# Patient Record
Sex: MALE | Race: WHITE | Employment: STUDENT | URBAN - NONMETROPOLITAN AREA
[De-identification: names, ages, dates, MRNs, and addresses within clinical notes are randomized per-mention and may not be internally consistent; named-entity substitution may affect disease eponyms.]

---

## 2017-11-03 ENCOUNTER — HOSPITAL ENCOUNTER (OUTPATIENT)
Dept: PHYSICAL THERAPY | Age: 22
Discharge: HOME | End: 2017-11-30
Admitting: EMERGENCY MEDICINE

## 2017-11-03 NOTE — PLAN OF CARE
[address] [facility] [place]  Phone: [phone]   Fax:     [phone]                                                       Physical Therapy Certification    Dear Referring Practitioner: Yann Toledo,    We had the pleasure of evaluating the following patient for physical therapy services at [address] [place] [address]. A summary of our findings can be found in the initial assessment below. This includes our plan of care. If you have any questions or concerns regarding these findings, please do not hesitate to contact me at the office phone number checked above. Thank you for the referral.       Physician Signature:_______________________________Date:__________________  By signing above (or electronic signature), therapists plan is approved by physician      Patient: Sachi Ramírez   : 1995   MRN: [id number]  Referring Physician: Referring Practitioner: Yann Toledo      Evaluation Date: 11/3/2017      Medical Diagnosis Information:  Diagnosis: LS disc/chronic spondy   Treatment Diagnosis: M54.5                                         Insurance information: PT Insurance Information: [facility]/[employer]      Precautions/ Contra-indications: bilateral pars L5 chronic  Latex Allergy:  [x]NO      []YES  Preferred Language for Healthcare:   [x]English       []other:    SUBJECTIVE: Patient stated complaint:Was deadlifting Monday- felt pain in low back, nothing in legs. No numbness/tingling. Had MRI tue, is on anti inflammatory. No pain since Wednesday afternoon. Been shut down from practice. 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    Functional Disability Index/G-Codes:  PT G-Codes  Functional Assessment Tool Used: ARUN  Score: 8% disability  Functional Limitation: Changing and maintaining body position  Changing and Maintaining Body Position Current []Co-Morbidities              []Cognitive Function, education/learning barriers              []Environmental, home barriers              []profession/work barriers  []past PT/medical experience  []other:  Justification:     Falls Risk Assessment (30 days):   [x] Falls Risk assessed and no intervention required. [] Falls Risk assessed and Patient requires intervention due to being higher risk   TUG score (>12s at risk):     [] Falls education provided, including       G-Codes:  PT G-Codes  Functional Assessment Tool Used: ARUN  Score: 8% disability  Functional Limitation: Changing and maintaining body position  Changing and Maintaining Body Position Current Status (): At least 1 percent but less than 20 percent impaired, limited or restricted  Changing and Maintaining Body Position Goal Status (): At least 1 percent but less than 20 percent impaired, limited or restricted    ASSESSMENT: Patient is a 23year old male with LBP at this point consistent with underlying motor control and facet dysfunction. No neurocompressive signs although does have pathological disc affecting S1.     Functional Impairments:     [x]Noted lumbar/proximal hip hypomobility   []Noted lumbosacral and/or generalized hypermobility   []Decreased Lumbosacral/hip/LE functional ROM   [x]Decreased core/proximal hip strength and neuromuscular control    [x]Decreased LE functional strength    []Abnormal reflexes/sensation/myotomal/dermatomal deficits  []Reduced balance/proprioceptive control    []other:      Functional Activity Limitations (from functional questionnaire and intake)   [x]Reduced ability to tolerate prolonged functional positions   []Reduced ability or difficulty with changes of positions or transfers between positions   []Reduced ability to maintain good posture and demonstrate good body mechanics with sitting, bending, and lifting   []Reduced ability to sleep   [] Reduced ability or tolerance with driving measures addressing any of the following: body structures and functions (impairments), activity limitations, and/or participation restrictions;:  [x] a total of 1-2 or more elements   [] a total of 3 or more elements   [] a total of 4 or more elements   [x] A clinical presentation with:  [x] stable and/or uncomplicated characteristics   [] evolving clinical presentation with changing characteristics  [] unstable and unpredictable characteristics;   [x] Clinical decision making of [x] low, [] moderate, [] high complexity using standardized patient assessment instrument and/or measurable assessment of functional outcome. [x] EVAL (LOW) 38322 (typically 20 minutes face-to-face)  [] EVAL (MOD) 62488 (typically 30 minutes face-to-face)  [] EVAL (HIGH) 49496 (typically 45 minutes face-to-face)  [] RE-EVAL     PLAN: Begin PT focusing on: proximal hip mobilizations, LB mobs, LB core activation, proximal hip activation, and HEP    Frequency/Duration:  2 days per week for 4 Weeks:  Interventions:  [x]  Therapeutic exercise including: strength training, ROM, for LE, Glutes and core   [x]  NMR activation and proprioception for glutes , LE and Core   [x]  Manual therapy as indicated for Hip complex, LE and spine to include: Dry Needling/IASTM, STM, PROM, Gr I-IV mobilizations, manipulation. [x]  Modalities as needed that may include: thermal agents, E-stim, Biofeedback, US, iontophoresis as indicated  [x]  Patient education on joint protection, postural re-education, activity modification, progression of HEP. HEP instruction: work with ATC- bridge, opp arm/leg, planks    GOALS:  Patient stated goal: full pitching/baseball    Therapist goals for Patient:   Short Term Goals: To be achieved in: 2 weeks  1. Independent in HEP and progression per patient tolerance, in order to prevent re-injury.    2. Patient will have a decrease in pain to facilitate improvement in movement, function, and ADLs as indicated by Functional Deficits. Long Term Goals: To be achieved in: 4 weeks  1. Disability index score of 5% or less for the ARUN to assist with reaching prior level of function. 2. Patient will demonstrate increased AROM to WNL, good LS mobility, good hip ROM to allow for proper joint functioning as indicated by patients Functional Deficits. 3. Patient will demonstrate an increase in Strength to good proximal hip and core activation to allow for proper functional mobility as indicated by patients Functional Deficits. 4. Patient will return to single leg eccentrics and core stability functional activities without increased symptoms or restriction.         Electronically signed by:  Genoveva Lopez PT

## 2017-11-03 NOTE — FLOWSHEET NOTE
[address]  [address] [facility]  Phone: [phone] Fax: [phone]    Physical Therapy Daily Treatment Note  Date:  11/3/2017    Patient Name:  Porfirio Faust    :  1995  MRN: [id number]  Restrictions/Precautions:    Medical/Treatment Diagnosis Information:  · Diagnosis: LS disc/chronic spondy  · Treatment Diagnosis: Q46.5  Insurance/Certification information:  PT Insurance Information: [facility]/[employer]   Physician Information:  Referring Practitioner: Lucien Camarillo of care signed (Y/N):     Date of Patient follow up with Physician:     G-Code (if applicable):      Date G-Code Applied:         Progress Note: []  Yes  []  No  Next due by: Visit #10      Latex Allergy:  [x]NO      []YES  Preferred Language for Healthcare:   [x]English       []other:    Visit # Insurance Allowable   1 60 (3 used)     Pain level:  1/10     SUBJECTIVE:  See eval    OBJECTIVE: See eval  Observation:   Test measurements:      RESTRICTIONS/PRECAUTIONS: chronic bilateral pars L5    Exercises/Interventions:     Therapeutic Ex  20 sets/sec reps notes   Hip Ext      Bridge 2-1 10 10    Kneeling Alt Arm-Leg 10 10    Side lying LB rot 2 15    Front Plank      Side planks       Kneeling hip abd/ext      1/2 kneeling down chop      Std band raise 2 15    SL hip abd/clam      Lateral band pull 5 10    Lateral band walk 4 15    Bosu Lunge 2 15    Slide lunge       Ham string stretch 3 30    Hip Flex stretch 3 30    Glute Stretch 3 30          Manual Intervention 12      DN      Prone PA      GISTM/STM 5     Lumbar Manip 5     SI Manip      Hip belt mobs      Hip LA distraction 2           NMR re-education 6      Mf Activation- re-ed 10 10 Prone    TrA Re-ed activation 10 10 supine    Glute Max re-ed activation 10 10 Prone                  Therapeutic Exercise and NMR EXR  [] (28653) Provided verbal/tactile cueing for activities related to strengthening, flexibility, endurance, ROM  for improvements in proximal hip and core control with self care, mobility, lifting and ambulation.  [] (60874) Provided verbal/tactile cueing for activities related to improving balance, coordination, kinesthetic sense, posture, motor skill, proprioception  to assist with core control in self care, mobility, lifting, and ambulation. Therapeutic Activities:    [] (14715 or 35549) Provided verbal/tactile cueing for activities related to improving balance, coordination, kinesthetic sense, posture, motor skill, proprioception and motor activation to allow for proper function  with self care and ADLs  [] (67240) Provided training and instruction to the patient for proper core and proximal hip recruitment and positioning with ambulation re-education     Home Exercise Program:    [x] (54544) Reviewed/Progressed HEP activities related to strengthening, flexibility, endurance, ROM of core, proximal hip and LE for functional self-care, mobility, lifting and ambulation   [] (69188) Reviewed/Progressed HEP activities related to improving balance, coordination, kinesthetic sense, posture, motor skill, proprioception of core, proximal hip and LE for self care, mobility, lifting, and ambulation      Manual Treatments:  PROM / STM / Oscillations-Mobs:  G-I, II, III, IV (PA's, Inf., Post.)  [] (11277) Provided manual therapy to mobilize proximal hip and LS spine soft tissue/joints for the purpose of modulating pain, promoting relaxation,  increasing ROM, reducing/eliminating soft tissue swelling/inflammation/restriction, improving soft tissue extensibility and allowing for proper ROM for normal function with self care, mobility, lifting and ambulation.      Modalities:       Charges:  Timed Code Treatment Minutes: 38   Total Treatment Minutes: 58     [x] EVAL (LOW) 65373 (typically 20 minutes face-to-face)  [] EVAL (MOD) 13626 (typically 30 minutes face-to-face)  [] EVAL (HIGH) 423 5422 (typically 45 minutes face-to-face)  [] RE-EVAL     [x] EH(84577) x  1   [] IONTO  [] NMR (90573) x      [] VASO  [x] Manual (38024) x  1    [] Other:  [] TA x       [] Mech Traction (17673)  [] ES(attended) (70658)      [] ES (un) (14255):     GOALS:  Patient stated goal: full pitching/baseball     Therapist goals for Patient:   Short Term Goals: To be achieved in: 2 weeks  1. Independent in HEP and progression per patient tolerance, in order to prevent re-injury. 2. Patient will have a decrease in pain to facilitate improvement in movement, function, and ADLs as indicated by Functional Deficits.     Long Term Goals: To be achieved in: 4 weeks  1. Disability index score of 5% or less for the ARUN to assist with reaching prior level of function. 2. Patient will demonstrate increased AROM to WNL, good LS mobility, good hip ROM to allow for proper joint functioning as indicated by patients Functional Deficits. 3. Patient will demonstrate an increase in Strength to good proximal hip and core activation to allow for proper functional mobility as indicated by patients Functional Deficits. 4. Patient will return to single leg eccentrics and core stability functional activities without increased symptoms or restriction    Progression Towards Functional goals:  [] Patient is progressing as expected towards functional goals listed. [] Progression is slowed due to complexities listed. [] Progression has been slowed due to co-morbidities.   [x] Plan just implemented, too soon to assess goals progression  [] Other:     ASSESSMENT:  See eval    Treatment/Activity Tolerance:  [x] Patient tolerated treatment well [] Patient limited by fatique  [] Patient limited by pain  [] Patient limited by other medical complications  [] Other:     Prognosis: [x] Good [] Fair  [] Poor    Patient Requires Follow-up: [x] Yes  [] No    PLAN: See eval  [] Continue per plan of care [] Alter current plan (see comments)  [x] Plan of care initiated [] Hold pending MD visit [] Discharge    Electronically signed by: Maribel Red PT

## 2017-11-07 ENCOUNTER — HOSPITAL ENCOUNTER (OUTPATIENT)
Dept: PHYSICAL THERAPY | Age: 22
Discharge: HOME | End: 2017-11-07
Admitting: EMERGENCY MEDICINE

## 2017-11-07 NOTE — FLOWSHEET NOTE
[address] [address] [facility]  Phone: [phone] Fax: [phone]    Physical Therapy Daily Treatment Note  Date:  2017    Patient Name:  Dayton Parker    :  1995  MRN: [id number]  Restrictions/Precautions:    Medical/Treatment Diagnosis Information:  · Diagnosis: LS disc/chronic spondy  · Treatment Diagnosis: A39.9  Insurance/Certification information:  PT Insurance Information: [facility]/[employer]   Physician Information:  Referring Practitioner: Dean Pascual of care signed (Y/N):     Date of Patient follow up with Physician:     G-Code (if applicable):      Date G-Code Applied:         Progress Note: []  Yes  []  No  Next due by: Visit #10      Latex Allergy:  [x]NO      []YES  Preferred Language for Healthcare:   [x]English       []other:    Visit # Insurance Allowable   2 60 (3 used)     Pain level:  1/10     SUBJECTIVE:  Back feeling good. Pt eager to get working. OBJECTIVE: cues for core activation and proper glute activation.    Observation:   Test measurements:      RESTRICTIONS/PRECAUTIONS: chronic bilateral pars L5    Exercises/Interventions:     Therapeutic Ex  35 sets/sec reps notes   Hip Ext      Bridge 2-1 5s 10 BOSU, SL   Kneeling Alt Arm-Leg 10 10 Gray, Airex   Side lying LB rot 2 15    Front Plank+ knee rotations 3 5 Hold 10s+rotations, BOSU   Standing Glute Kicks 2 10 maroon   Glute Step Up + wedge 2 10 8inch, wedge for foot, no wt   SL RDL 2 10 Wedge, stick for form   Std band raise    SL hip abd/clam      Lateral band pull 5 10    Lateral band walk 4 15 2 maroon   Bosu Lunge 2 15 Cues for glute emphasis   Slide lunge       Ham string stretch 3 30    Hip Flex stretch 3 30    Glute Stretch 3 30          Manual Intervention 0      DN      Prone PA      GISTM/STM 5     Lumbar Manip 5     SI Manip      Hip belt mobs      Hip LA distraction 2           NMR re-education 10      Mf Activation- re-ed 10 10 Prone    TrA Re-ed activation 10 10 Supine, LE's only   Glute Max re-ed activation 10 10 Prone    frog 10 10            Therapeutic Exercise and NMR EXR  [] (99767) Provided verbal/tactile cueing for activities related to strengthening, flexibility, endurance, ROM  for improvements in proximal hip and core control with self care, mobility, lifting and ambulation.  [] (38971) Provided verbal/tactile cueing for activities related to improving balance, coordination, kinesthetic sense, posture, motor skill, proprioception  to assist with core control in self care, mobility, lifting, and ambulation. Therapeutic Activities:    [] (83686 or 43101) Provided verbal/tactile cueing for activities related to improving balance, coordination, kinesthetic sense, posture, motor skill, proprioception and motor activation to allow for proper function  with self care and ADLs  [] (84462) Provided training and instruction to the patient for proper core and proximal hip recruitment and positioning with ambulation re-education     Home Exercise Program:    [x] (65607) Reviewed/Progressed HEP activities related to strengthening, flexibility, endurance, ROM of core, proximal hip and LE for functional self-care, mobility, lifting and ambulation   [] (17097) Reviewed/Progressed HEP activities related to improving balance, coordination, kinesthetic sense, posture, motor skill, proprioception of core, proximal hip and LE for self care, mobility, lifting, and ambulation      Manual Treatments:  PROM / STM / Oscillations-Mobs:  G-I, II, III, IV (PA's, Inf., Post.)  [] (65966) Provided manual therapy to mobilize proximal hip and LS spine soft tissue/joints for the purpose of modulating pain, promoting relaxation,  increasing ROM, reducing/eliminating soft tissue swelling/inflammation/restriction, improving soft tissue extensibility and allowing for proper ROM for normal function with self care, mobility, lifting and ambulation. Modalities:       Charges:  Timed Code Treatment Minutes: 45   Total Treatment Minutes: 45     [x] EVAL (LOW) 11196 (typically 20 minutes face-to-face)  [] EVAL (MOD) 24887 (typically 30 minutes face-to-face)  [] EVAL (HIGH) 61188 (typically 45 minutes face-to-face)  [] RE-EVAL     [x] BQ(10365) x  2   [] IONTO  [x] NMR (87186) x  1   [] VASO  [] Manual (08176) x       [] Other:  [] TA x       [] Mech Traction (39188)  [] ES(attended) (58217)      [] ES (un) (65306):     GOALS:  Patient stated goal: full pitching/baseball     Therapist goals for Patient:   Short Term Goals: To be achieved in: 2 weeks  1. Independent in HEP and progression per patient tolerance, in order to prevent re-injury. 2. Patient will have a decrease in pain to facilitate improvement in movement, function, and ADLs as indicated by Functional Deficits.     Long Term Goals: To be achieved in: 4 weeks  1. Disability index score of 5% or less for the ARUN to assist with reaching prior level of function. 2. Patient will demonstrate increased AROM to WNL, good LS mobility, good hip ROM to allow for proper joint functioning as indicated by patients Functional Deficits. 3. Patient will demonstrate an increase in Strength to good proximal hip and core activation to allow for proper functional mobility as indicated by patients Functional Deficits. 4. Patient will return to single leg eccentrics and core stability functional activities without increased symptoms or restriction    Progression Towards Functional goals:  [] Patient is progressing as expected towards functional goals listed. [] Progression is slowed due to complexities listed. [] Progression has been slowed due to co-morbidities. [x] Plan just implemented, too soon to assess goals progression  [] Other:     ASSESSMENT:  Good tolerance to exercise.   No pain at conclusion of treatment, but Pt had difficulty with core and glute activation to have proper form with exercise in

## 2017-11-09 ENCOUNTER — HOSPITAL ENCOUNTER (OUTPATIENT)
Dept: PHYSICAL THERAPY | Age: 22
Discharge: HOME | End: 2017-11-09
Admitting: EMERGENCY MEDICINE

## 2017-11-09 NOTE — FLOWSHEET NOTE
[address] [address] [place] [address]  Phone: [phone] Fax: [phone]    Physical Therapy Daily Treatment Note  Date:  2017    Patient Name:  Krishan Segovia    :  1995  MRN: [id number]  Restrictions/Precautions:    Medical/Treatment Diagnosis Information:  · Diagnosis: LS disc/chronic spondy  · Treatment Diagnosis: I84.5  Insurance/Certification information:  PT Insurance Information: [facility]/[employer]   Physician Information:  Referring Practitioner: Zahida Bolanos of care signed (Y/N):     Date of Patient follow up with Physician:     G-Code (if applicable):      Date G-Code Applied:         Progress Note: []  Yes  []  No  Next due by: Visit #10      Latex Allergy:  [x]NO      []YES  Preferred Language for Healthcare:   [x]English       []other:    Visit # Insurance Allowable   3 60 (3 used)     Pain level:  1/10     SUBJECTIVE:  Back feeling good. Some soreness last time, but more from not having done anything. OBJECTIVE: cues for core activation and proper glute activation. Observation:   Test measurements:      RESTRICTIONS/PRECAUTIONS: chronic bilateral pars L5    Exercises/Interventions:     Therapeutic Ex  40 sets/sec reps notes   Bike      Bridge 2-1 5s 10 BOSU, SL   Kneeling Alt Arm-Leg 10 10 [place], Airex   Side lying LB rot 2 15    Front Plank+ knee rotations   Hold 10s+rotations, BOSU   Standing Glute Kicks 2 10 maroon   Glute Step Up + wedge 2 10 8inch, wedge for foot, no wt   SL RDL 2 10 Form!    Std band raise    SL hip abd/clam      Lateral band pull 5 10    Lateral band walk 4 15 2 maroon   Bosu Lunge 2 15 Cues for glute emphasis   Slide lunge  3 10    SC march all 4 ways 4 50    SC lunge- both way 2 10 Both legs, ea way   SC pitching  15 R handed         Manual Intervention 0      DN      Prone PA      GISTM/STM      Lumbar Manip      SI Manip      Hip belt mobs      Hip LA distraction            NMR re-education 15      Mf Activation- re-ed 10 10 Prone    TrA Re-ed activation 10 10 Supine, LE's only   Glute Max re-ed activation 10 10 Prone    frog 10 10            Therapeutic Exercise and NMR EXR  [x] (86181) Provided verbal/tactile cueing for activities related to strengthening, flexibility, endurance, ROM  for improvements in proximal hip and core control with self care, mobility, lifting and ambulation. [x] (56739) Provided verbal/tactile cueing for activities related to improving balance, coordination, kinesthetic sense, posture, motor skill, proprioception  to assist with core control in self care, mobility, lifting, and ambulation.      Therapeutic Activities:    [x] (21379 or 71201) Provided verbal/tactile cueing for activities related to improving balance, coordination, kinesthetic sense, posture, motor skill, proprioception and motor activation to allow for proper function  with self care and ADLs  [] (25631) Provided training and instruction to the patient for proper core and proximal hip recruitment and positioning with ambulation re-education     Home Exercise Program:    [x] (77922) Reviewed/Progressed HEP activities related to strengthening, flexibility, endurance, ROM of core, proximal hip and LE for functional self-care, mobility, lifting and ambulation   [] (83566) Reviewed/Progressed HEP activities related to improving balance, coordination, kinesthetic sense, posture, motor skill, proprioception of core, proximal hip and LE for self care, mobility, lifting, and ambulation      Manual Treatments:  PROM / STM / Oscillations-Mobs:  G-I, II, III, IV (PA's, Inf., Post.)  [] (67791) Provided manual therapy to mobilize proximal hip and LS spine soft tissue/joints for the purpose of modulating pain, promoting relaxation,  increasing ROM, reducing/eliminating soft tissue swelling/inflammation/restriction, improving soft tissue extensibility and allowing for proper ROM for normal function with self care, mobility, lifting and ambulation. Modalities:       Charges:  Timed Code Treatment Minutes: 55   Total Treatment Minutes: 55     [] EVAL (LOW) 96412 (typically 20 minutes face-to-face)  [] EVAL (MOD) 72135 (typically 30 minutes face-to-face)  [] EVAL (HIGH) 39520 (typically 45 minutes face-to-face)  [] RE-EVAL      [x] BI(75166) x  3   [] IONTO  [x] NMR (57454) x  1   [] VASO  [] Manual (43957) x       [] Other:  [] TA x       [] Mech Traction (18670)  [] ES(attended) (27285)      [] ES (un) (13150):     GOALS:  Patient stated goal: full pitching/baseball     Therapist goals for Patient:   Short Term Goals: To be achieved in: 2 weeks  1. Independent in HEP and progression per patient tolerance, in order to prevent re-injury. 2. Patient will have a decrease in pain to facilitate improvement in movement, function, and ADLs as indicated by Functional Deficits.     Long Term Goals: To be achieved in: 4 weeks  1. Disability index score of 5% or less for the ARUN to assist with reaching prior level of function. 2. Patient will demonstrate increased AROM to WNL, good LS mobility, good hip ROM to allow for proper joint functioning as indicated by patients Functional Deficits. 3. Patient will demonstrate an increase in Strength to good proximal hip and core activation to allow for proper functional mobility as indicated by patients Functional Deficits. 4. Patient will return to single leg eccentrics and core stability functional activities without increased symptoms or restriction    Progression Towards Functional goals:  [x] Patient is progressing as expected towards functional goals listed. [] Progression is slowed due to complexities listed. [] Progression has been slowed due to co-morbidities. [] Plan just implemented, too soon to assess goals progression  [] Other:     ASSESSMENT:  Good tolerance to exercise. Progressing core and proximal hip working into sport specific positioning.

## 2017-11-15 ENCOUNTER — HOSPITAL ENCOUNTER (OUTPATIENT)
Dept: PHYSICAL THERAPY | Age: 22
Discharge: HOME | End: 2017-11-15
Admitting: EMERGENCY MEDICINE

## 2017-11-15 NOTE — FLOWSHEET NOTE
NMR re-education 15      Mf Activation- re-ed 10 10 Prone    TrA Re-ed activation 10 10 Supine, LE's only   Glute Max re-ed activation 10 10 Prone    frog 10 10            Therapeutic Exercise and NMR EXR  [x] (81072) Provided verbal/tactile cueing for activities related to strengthening, flexibility, endurance, ROM  for improvements in proximal hip and core control with self care, mobility, lifting and ambulation. [x] (90069) Provided verbal/tactile cueing for activities related to improving balance, coordination, kinesthetic sense, posture, motor skill, proprioception  to assist with core control in self care, mobility, lifting, and ambulation.      Therapeutic Activities:    [x] (10385 or 27829) Provided verbal/tactile cueing for activities related to improving balance, coordination, kinesthetic sense, posture, motor skill, proprioception and motor activation to allow for proper function  with self care and ADLs  [] (14455) Provided training and instruction to the patient for proper core and proximal hip recruitment and positioning with ambulation re-education     Home Exercise Program:    [x] (24215) Reviewed/Progressed HEP activities related to strengthening, flexibility, endurance, ROM of core, proximal hip and LE for functional self-care, mobility, lifting and ambulation   [] (69655) Reviewed/Progressed HEP activities related to improving balance, coordination, kinesthetic sense, posture, motor skill, proprioception of core, proximal hip and LE for self care, mobility, lifting, and ambulation      Manual Treatments:  PROM / STM / Oscillations-Mobs:  G-I, II, III, IV (PA's, Inf., Post.)  [] (60805) Provided manual therapy to mobilize proximal hip and LS spine soft tissue/joints for the purpose of modulating pain, promoting relaxation,  increasing ROM, reducing/eliminating soft tissue swelling/inflammation/restriction, improving soft tissue extensibility and allowing for proper ROM for normal function Patient may begin working into throwing with .      Treatment/Activity Tolerance:  [x] Patient tolerated treatment well [] Patient limited by fatique  [] Patient limited by pain  [] Patient limited by other medical complications  [] Other:     Prognosis: [x] Good [] Fair  [] Poor    Patient Requires Follow-up: [x] Yes  [] No    PLAN: Continue core/proximal hip- working into sport specific  [] Continue per plan of care [] Alter current plan (see comments)  [x] Plan of care initiated [] Hold pending MD visit [] Discharge    Electronically signed by: Sumaya Carrera, [id number] [employer]

## 2017-11-17 ENCOUNTER — HOSPITAL ENCOUNTER (OUTPATIENT)
Dept: PHYSICAL THERAPY | Age: 22
Discharge: HOME | End: 2017-11-17
Admitting: EMERGENCY MEDICINE

## 2017-11-17 NOTE — FLOWSHEET NOTE
[address] [address] [facility] [place]  Phone: [phone] Fax: [phone]    Physical Therapy Daily Treatment Note  Date:  2017    Patient Name:  Viviana Coates    :  1995  MRN: [id number]  Restrictions/Precautions:    Medical/Treatment Diagnosis Information:  · Diagnosis: LS disc/chronic spondy  · Treatment Diagnosis: X56.4  Insurance/Certification information:  PT Insurance Information: [facility]/[employer]   Physician Information:  Referring Practitioner: Stevenson Schwab of care signed (Y/N):     Date of Patient follow up with Physician:     G-Code (if applicable):      Date G-Code Applied:         Progress Note: []  Yes  []  No  Next due by: Visit #10      Latex Allergy:  [x]NO      []YES  Preferred Language for Healthcare:   [x]English       []other:    Visit # Insurance Allowable   5 60 (3 used)     Pain level:  1/10     SUBJECTIVE:  Patient reports reports fatigue after last treatment but no increased pain     OBJECTIVE: cues for core activation and proper glute activation. Observation:   Test measurements:      RESTRICTIONS/PRECAUTIONS: chronic bilateral pars L5    Exercises/Interventions:     Therapeutic Ex  40 sets/sec reps notes   Bike      Bridge 2-1 5s 10 BOSU, SL   Kneeling Alt Arm-Leg 10 10 [place], Airex   Side lying LB rot 2 15    Front Plank+ knee rotations   Hold 10s+rotations, BOSU   Standing Glute Kicks 2 10 maroon   Glute Step Up + wedge 2 10 8inch, wedge for foot, no wt   SL RDL 2 10 Form!    Std band raise    SL hip abd/clam      Lateral band pull 5 10    Lateral band walk 4 15 2 maroon   Bosu Lunge 2 15 Cues for glute emphasis   Slide lunge  3 10    SC march all 4 ways 4 50    SC lunge- both way 2 10 Both legs, ea way   SC pitching  15 R handed   Rebounder tosses 2 10 3#    Manual Intervention 0      DN      Prone PA      GISTM/STM      Lumbar Manip      SI Manip      Hip belt mobs      Hip LA distraction NMR re-education 15      Mf Activation- re-ed 10 10 Prone    TrA Re-ed activation 10 10 Supine, LE's only   Glute Max re-ed activation 10 10 Prone    frog 10 10            Therapeutic Exercise and NMR EXR  [x] (41476) Provided verbal/tactile cueing for activities related to strengthening, flexibility, endurance, ROM  for improvements in proximal hip and core control with self care, mobility, lifting and ambulation. [x] (67795) Provided verbal/tactile cueing for activities related to improving balance, coordination, kinesthetic sense, posture, motor skill, proprioception  to assist with core control in self care, mobility, lifting, and ambulation.      Therapeutic Activities:    [x] (77707 or 66061) Provided verbal/tactile cueing for activities related to improving balance, coordination, kinesthetic sense, posture, motor skill, proprioception and motor activation to allow for proper function  with self care and ADLs  [] (80265) Provided training and instruction to the patient for proper core and proximal hip recruitment and positioning with ambulation re-education     Home Exercise Program:    [x] (74184) Reviewed/Progressed HEP activities related to strengthening, flexibility, endurance, ROM of core, proximal hip and LE for functional self-care, mobility, lifting and ambulation   [] (17130) Reviewed/Progressed HEP activities related to improving balance, coordination, kinesthetic sense, posture, motor skill, proprioception of core, proximal hip and LE for self care, mobility, lifting, and ambulation      Manual Treatments:  PROM / STM / Oscillations-Mobs:  G-I, II, III, IV (PA's, Inf., Post.)  [] (93605) Provided manual therapy to mobilize proximal hip and LS spine soft tissue/joints for the purpose of modulating pain, promoting relaxation,  increasing ROM, reducing/eliminating soft tissue swelling/inflammation/restriction, improving soft tissue extensibility and allowing for proper ROM for normal function with self care, mobility, lifting and ambulation. Modalities:       Charges:  Timed Code Treatment Minutes: 55   Total Treatment Minutes: 55     [] EVAL (LOW) 80758 (typically 20 minutes face-to-face)  [] EVAL (MOD) 58767 (typically 30 minutes face-to-face)  [] EVAL (HIGH) 74961 (typically 45 minutes face-to-face)  [] RE-EVAL      [x] CQ(99168) x  3   [] IONTO  [x] NMR (34332) x  1   [] VASO  [] Manual (96754) x       [] Other:  [] TA x       [] Mech Traction (34377)  [] ES(attended) (02812)      [] ES (un) (88981):     GOALS:  Patient stated goal: full pitching/baseball     Therapist goals for Patient:   Short Term Goals: To be achieved in: 2 weeks  1. Independent in HEP and progression per patient tolerance, in order to prevent re-injury. 2. Patient will have a decrease in pain to facilitate improvement in movement, function, and ADLs as indicated by Functional Deficits.     Long Term Goals: To be achieved in: 4 weeks  1. Disability index score of 5% or less for the ARUN to assist with reaching prior level of function. 2. Patient will demonstrate increased AROM to WNL, good LS mobility, good hip ROM to allow for proper joint functioning as indicated by patients Functional Deficits. 3. Patient will demonstrate an increase in Strength to good proximal hip and core activation to allow for proper functional mobility as indicated by patients Functional Deficits. 4. Patient will return to single leg eccentrics and core stability functional activities without increased symptoms or restriction    Progression Towards Functional goals:  [x] Patient is progressing as expected towards functional goals listed. [] Progression is slowed due to complexities listed. [] Progression has been slowed due to co-morbidities. [] Plan just implemented, too soon to assess goals progression  [] Other:     ASSESSMENT:  Good tolerance to exercise. Progressing core and proximal hip working into sport specific positioning. Patient may begin working into throwing with .      Treatment/Activity Tolerance:  [x] Patient tolerated treatment well [] Patient limited by fatique  [] Patient limited by pain  [] Patient limited by other medical complications  [] Other:     Prognosis: [x] Good [] Fair  [] Poor    Patient Requires Follow-up: [x] Yes  [] No    PLAN: Continue core/proximal hip- working into sport specific  [] Continue per plan of care [] Alter current plan (see comments)  [x] Plan of care initiated [] Hold pending MD visit [] Discharge    Electronically signed by: Brian Eddy, [id number] [employer]

## 2017-11-28 ENCOUNTER — HOSPITAL ENCOUNTER (OUTPATIENT)
Dept: PHYSICAL THERAPY | Age: 22
Discharge: HOME | End: 2017-11-28
Admitting: EMERGENCY MEDICINE

## 2017-11-28 NOTE — FLOWSHEET NOTE
[address] [address] [place]  Phone: [phone] Fax: [phone]    Physical Therapy Daily Treatment Note  Date:  2017    Patient Name:  Debora Dunbar    :  1995  MRN: [id number]  Restrictions/Precautions:    Medical/Treatment Diagnosis Information:  · Diagnosis: LS disc/chronic spondy  · Treatment Diagnosis: Q36.5  Insurance/Certification information:  PT Insurance Information: [facility]/[employer]   Physician Information:  Referring Practitioner: [place] of care signed (Y/N):     Date of Patient follow up with Physician:     G-Code (if applicable):      Date G-Code Applied:         Progress Note: []  Yes  []  No  Next due by: Visit #10      Latex Allergy:  [x]NO      []YES  Preferred Language for Healthcare:   [x]English       []other:    Visit # Insurance Allowable   6 60 (3 used)     Pain level:  0/10     SUBJECTIVE:  Patient reports reports fatigue after last treatment but not feeling pain. Pt has started back into some pitching without issue. Plans to pitch possibly tomorrow. OBJECTIVE: cues for core activation and proper glute activation. Observation:   Test measurements:      RESTRICTIONS/PRECAUTIONS: chronic bilateral pars L5    Exercises/Interventions:     Therapeutic Ex  40 sets/sec reps notes   Bike      Bridge 2-1 5s 10 BOSU, SL   Kneeling Alt Arm-Leg 10 10 [place], Airex   Side lying LB rot 2 15    Front Plank+ knee rotations   Hold 10s+rotations, BOSU   Standing Glute Kicks 2 10 maroon   Glute Step Up + wedge 2 10 8inch, wedge for foot, no wt   SL RDL 2 10 Form!    Std band raise    SL hip abd/clam      Lateral band pull 5 10    Lateral band walk 4 15 2 maroon   Bosu Lunge 2 15 Cues for glute emphasis   Slide lunge  3 10    SC march all 4 ways 4 50    SC lunge- both way 2 10 Both legs, ea way   SC pitching  15 R handed   Rebounder tosses 2 10 3#    Manual Intervention 0      DN      Prone PA GISTM/STM      Lumbar Manip      SI Manip      Hip belt mobs      Hip LA distraction            NMR re-education 15      Mf Activation- re-ed 10 10 Prone    TrA Re-ed activation 10 10 Supine, LE's only   Glute Max re-ed activation 10 10 Prone    frog 10 10            Therapeutic Exercise and NMR EXR  [x] (65011) Provided verbal/tactile cueing for activities related to strengthening, flexibility, endurance, ROM  for improvements in proximal hip and core control with self care, mobility, lifting and ambulation. [x] (43067) Provided verbal/tactile cueing for activities related to improving balance, coordination, kinesthetic sense, posture, motor skill, proprioception  to assist with core control in self care, mobility, lifting, and ambulation.      Therapeutic Activities:    [x] (69283 or 48579) Provided verbal/tactile cueing for activities related to improving balance, coordination, kinesthetic sense, posture, motor skill, proprioception and motor activation to allow for proper function  with self care and ADLs  [] (91910) Provided training and instruction to the patient for proper core and proximal hip recruitment and positioning with ambulation re-education     Home Exercise Program:    [x] (91390) Reviewed/Progressed HEP activities related to strengthening, flexibility, endurance, ROM of core, proximal hip and LE for functional self-care, mobility, lifting and ambulation   [] (68131) Reviewed/Progressed HEP activities related to improving balance, coordination, kinesthetic sense, posture, motor skill, proprioception of core, proximal hip and LE for self care, mobility, lifting, and ambulation      Manual Treatments:  PROM / STM / Oscillations-Mobs:  G-I, II, III, IV (PA's, Inf., Post.)  [] (86661) Provided manual therapy to mobilize proximal hip and LS spine soft tissue/joints for the purpose of modulating pain, promoting relaxation,  increasing ROM, reducing/eliminating soft tissue swelling/inflammation/restriction, improving soft tissue extensibility and allowing for proper ROM for normal function with self care, mobility, lifting and ambulation. Modalities:       Charges:  Timed Code Treatment Minutes: 55   Total Treatment Minutes: 55     [] EVAL (LOW) 98409 (typically 20 minutes face-to-face)  [] EVAL (MOD) 02397 (typically 30 minutes face-to-face)  [] EVAL (HIGH) 40482 (typically 45 minutes face-to-face)  [] RE-EVAL      [x] IS(42255) x  3   [] IONTO  [x] NMR (79983) x  1   [] VASO  [] Manual (42998) x       [] Other:  [] TA x       [] Mech Traction (21517)  [] ES(attended) (23542)      [] ES (un) (22673):     GOALS:  Patient stated goal: full pitching/baseball     Therapist goals for Patient:   Short Term Goals: To be achieved in: 2 weeks  1. Independent in HEP and progression per patient tolerance, in order to prevent re-injury. 2. Patient will have a decrease in pain to facilitate improvement in movement, function, and ADLs as indicated by Functional Deficits.     Long Term Goals: To be achieved in: 4 weeks  1. Disability index score of 5% or less for the ARUN to assist with reaching prior level of function. 2. Patient will demonstrate increased AROM to WNL, good LS mobility, good hip ROM to allow for proper joint functioning as indicated by patients Functional Deficits. 3. Patient will demonstrate an increase in Strength to good proximal hip and core activation to allow for proper functional mobility as indicated by patients Functional Deficits. 4. Patient will return to single leg eccentrics and core stability functional activities without increased symptoms or restriction    Progression Towards Functional goals:  [x] Patient is progressing as expected towards functional goals listed. [] Progression is slowed due to complexities listed. [] Progression has been slowed due to co-morbidities.    [] Plan just implemented, too soon to assess goals progression  [] Other:

## 2017-11-30 ENCOUNTER — HOSPITAL ENCOUNTER (OUTPATIENT)
Dept: PHYSICAL THERAPY | Age: 22
Discharge: HOME | End: 2017-11-30
Admitting: EMERGENCY MEDICINE

## 2017-11-30 NOTE — FLOWSHEET NOTE
ASSESSMENT:  Good tolerance to exercise. Progressing core and proximal hip working into sport specific positioning. Patient to continue working with ATC to progress back into sport. Treatment/Activity Tolerance:  [x] Patient tolerated treatment well [] Patient limited by fatique  [] Patient limited by pain  [] Patient limited by other medical complications  [] Other:     Prognosis: [x] Good [] Fair  [] Poor    Patient Requires Follow-up: [x] Yes  [] No    PLAN:  D/C at this time.   Pt to contact PT if he has questions regarding lifting or issues with return to baseball.  [] Continue per plan of care [] Alter current plan (see comments)  [] Plan of care initiated [] Hold pending MD visit [x] Discharge    Electronically signed by: Tenzin Florian PTA

## 2017-12-01 ENCOUNTER — HOSPITAL ENCOUNTER (OUTPATIENT)
Dept: OTHER | Age: 22
Discharge: HOME | End: 2017-12-31
Attending: EMERGENCY MEDICINE | Admitting: EMERGENCY MEDICINE

## 2019-02-22 ENCOUNTER — HOSPITAL ENCOUNTER (OUTPATIENT)
Dept: PHYSICAL THERAPY | Age: 24
Setting detail: THERAPIES SERIES
Discharge: HOME | End: 2019-02-22
Payer: COMMERCIAL

## 2019-02-22 PROCEDURE — 97110 THERAPEUTIC EXERCISES: CPT

## 2019-02-22 PROCEDURE — 97140 MANUAL THERAPY 1/> REGIONS: CPT

## 2019-02-22 PROCEDURE — 97161 PT EVAL LOW COMPLEX 20 MIN: CPT

## 2019-02-25 PROCEDURE — G8984 CARRY CURRENT STATUS: HCPCS

## 2019-02-25 PROCEDURE — G8985 CARRY GOAL STATUS: HCPCS

## 2019-02-27 ENCOUNTER — HOSPITAL ENCOUNTER (OUTPATIENT)
Dept: PHYSICAL THERAPY | Age: 24
Setting detail: THERAPIES SERIES
Discharge: HOME | End: 2019-02-27
Payer: COMMERCIAL

## 2019-02-27 PROCEDURE — 97140 MANUAL THERAPY 1/> REGIONS: CPT

## 2019-02-27 PROCEDURE — 97016 VASOPNEUMATIC DEVICE THERAPY: CPT

## 2019-02-27 PROCEDURE — 97110 THERAPEUTIC EXERCISES: CPT

## 2019-03-04 ENCOUNTER — HOSPITAL ENCOUNTER (OUTPATIENT)
Dept: PHYSICAL THERAPY | Age: 24
Setting detail: THERAPIES SERIES
Discharge: HOME | End: 2019-03-04
Payer: COMMERCIAL

## 2019-03-04 PROCEDURE — 97110 THERAPEUTIC EXERCISES: CPT

## 2019-03-04 PROCEDURE — 97140 MANUAL THERAPY 1/> REGIONS: CPT

## 2019-03-11 ENCOUNTER — HOSPITAL ENCOUNTER (OUTPATIENT)
Dept: PHYSICAL THERAPY | Age: 24
Setting detail: THERAPIES SERIES
Discharge: HOME | End: 2019-03-11
Payer: COMMERCIAL

## 2019-03-11 PROCEDURE — 97140 MANUAL THERAPY 1/> REGIONS: CPT

## 2019-03-11 PROCEDURE — 97110 THERAPEUTIC EXERCISES: CPT

## 2019-03-15 ENCOUNTER — HOSPITAL ENCOUNTER (OUTPATIENT)
Dept: PHYSICAL THERAPY | Age: 24
Setting detail: THERAPIES SERIES
Discharge: HOME | End: 2019-03-15
Payer: COMMERCIAL

## 2019-03-15 PROCEDURE — 97140 MANUAL THERAPY 1/> REGIONS: CPT

## 2019-03-15 PROCEDURE — 97110 THERAPEUTIC EXERCISES: CPT

## 2019-03-18 ENCOUNTER — HOSPITAL ENCOUNTER (OUTPATIENT)
Dept: PHYSICAL THERAPY | Age: 24
Setting detail: THERAPIES SERIES
Discharge: HOME | End: 2019-03-18
Payer: COMMERCIAL

## 2019-03-18 PROCEDURE — 97110 THERAPEUTIC EXERCISES: CPT

## 2019-03-18 PROCEDURE — 97140 MANUAL THERAPY 1/> REGIONS: CPT

## 2019-03-20 ENCOUNTER — HOSPITAL ENCOUNTER (OUTPATIENT)
Dept: PHYSICAL THERAPY | Age: 24
Setting detail: THERAPIES SERIES
Discharge: HOME | End: 2019-03-20
Payer: COMMERCIAL

## 2019-03-20 PROCEDURE — 97140 MANUAL THERAPY 1/> REGIONS: CPT

## 2019-03-20 PROCEDURE — 97110 THERAPEUTIC EXERCISES: CPT

## 2019-03-25 ENCOUNTER — HOSPITAL ENCOUNTER (OUTPATIENT)
Dept: PHYSICAL THERAPY | Age: 24
Setting detail: THERAPIES SERIES
Discharge: HOME | End: 2019-03-25
Payer: COMMERCIAL

## 2019-03-25 PROCEDURE — 97140 MANUAL THERAPY 1/> REGIONS: CPT

## 2019-03-25 PROCEDURE — 97110 THERAPEUTIC EXERCISES: CPT

## 2019-03-27 ENCOUNTER — HOSPITAL ENCOUNTER (OUTPATIENT)
Dept: PHYSICAL THERAPY | Age: 24
Setting detail: THERAPIES SERIES
Discharge: HOME | End: 2019-03-27
Payer: COMMERCIAL

## 2019-03-27 PROCEDURE — 97140 MANUAL THERAPY 1/> REGIONS: CPT

## 2019-03-27 PROCEDURE — 97110 THERAPEUTIC EXERCISES: CPT

## 2019-04-01 ENCOUNTER — HOSPITAL ENCOUNTER (OUTPATIENT)
Dept: PHYSICAL THERAPY | Age: 24
Setting detail: THERAPIES SERIES
Discharge: HOME | End: 2019-04-01
Payer: COMMERCIAL

## 2019-04-01 PROCEDURE — 97110 THERAPEUTIC EXERCISES: CPT

## 2019-04-01 PROCEDURE — 97140 MANUAL THERAPY 1/> REGIONS: CPT

## 2019-04-01 NOTE — FLOWSHEET NOTE
[facility] [address] [facility] [address]  Phone: [phone] Fax: [phone]      Physical Therapy Daily Treatment Note  Date:  2019    Patient Name:  Gabrielle Guerin    :  1995  MRN: [id number]  Restrictions/Precautions:    Medical/Treatment Diagnosis Information:  Diagnosis: s/p right UCL repair     Insurance/Certification information:     Physician Information:  Referring Practitioner: Alex Curtis MD  Plan of care signed (Y/N):     Date of Patient follow up with Physician:     G-Code (if applicable):      Date G-Code Applied:         Progress Note: [x]  Yes  []  No  Next due by: Visit #10      Latex Allergy:  [x]NO      []YES  Preferred Language for Healthcare:   [x]English       []other:    Visit # Insurance Allowable   9 120     Pain level:  0-3/10     SUBJECTIVE:  Patient reports that he is feeling good, Feels that strength and ROM are progressing well. OBJECTIVE: noted decreased swelling through right elbow. Incision healing well. Observation: Pt arrives without brace today.    Test measurements:  Ext =5, lrxu=112    RESTRICTIONS/PRECAUTIONS: per [facility] protocol    Exercises/Interventions:   Therapeutic Ex (05945) X30 Sets/sec Reps CUES/Notes   UBE 5'  Low position and resistance level   Pendulum/Ball rolls      Cane AAROM flex/press      4 way Isomet 10\" 10 Flex, abd, IR, ext   T- band Row/pinch 2 10 red   T- band lower pinch 2 10 red   T- band ER activation 2 10 red   Supine SA punch 2 10 2# 5\"   SL ER 2 10 2#   Prone Rows, horix abd, flex (with elbow flexed) 2 10 2#   Putty , finger pinch, extension each   Seat Table slides/Wall Slides      Table protraction 2 10    Wall push ups 2 10 Easy    Scap Wall Lat touches/wall walks      scap squeezes 5s 20    Standing flex/scap 2 20 2# @ mirror   Standing Punch      Lawnmower      Wrist flexion/ext/RD 2 10 2#   Active pronation/supination 1 20 2#   Prone ER 2 10 0# 90/90 TB ER 2 10 red   Manual Intervention  (24728) X15      Shld /GH Mobs      Post Cap mobs      Thoracic/Rib manipualtion      CT MT/Mobs      PROM MT      Elbow gentle PROM/STM distal tricep 10'     Shoulder PROM 5'     NMR re-education (75321)      T-spine Ext      GH depress/compress      Scap/GH NMR      Body blade 20 2 Medium BB flexion and abduction @ shoulder height   Wall ball roll      Wall Ball bounce      Ball drops      Lauren Scap Bio      Floor Snow angels-sliders            Therapeutic Activity (27728)      UE throwing porgression      Dynamic UE stability      Earthquake Bar      Bodyblade                Therapeutic Exercise and NMR EXR  [x] (32808) Provided verbal/tactile cueing for activities related to strengthening, flexibility, endurance, ROM  for improvements in scapular, scapulothoracic and UE control with self care, reaching, carrying, lifting, house/yardwork, driving/computer work.    [] (27954) Provided verbal/tactile cueing for activities related to improving balance, coordination, kinesthetic sense, posture, motor skill, proprioception  to assist with  scapular, scapulothoracic and UE control with self care, reaching, carrying, lifting, house/yardwork, driving/computer work. Therapeutic Activities:    [] (20277 or 26525) Provided verbal/tactile cueing for activities related to improving balance, coordination, kinesthetic sense, posture, motor skill, proprioception and motor activation to allow for proper function of scapular, scapulothoracic and UE control with self care, carrying, lifting, driving/computer work.      Home Exercise Program:    [x] (95542) Reviewed/Progressed HEP activities related to strengthening, flexibility, endurance, ROM of scapular, scapulothoracic and UE control with self care, reaching, carrying, lifting, house/yardwork, driving/computer work  [] (39510) Reviewed/Progressed HEP activities related to improving balance, coordination, kinesthetic sense, posture,

## 2019-04-03 ENCOUNTER — HOSPITAL ENCOUNTER (OUTPATIENT)
Dept: PHYSICAL THERAPY | Age: 24
Setting detail: THERAPIES SERIES
Discharge: HOME | End: 2019-04-03
Payer: COMMERCIAL

## 2019-04-03 PROCEDURE — 97110 THERAPEUTIC EXERCISES: CPT

## 2019-04-03 PROCEDURE — 97140 MANUAL THERAPY 1/> REGIONS: CPT

## 2019-04-03 NOTE — FLOWSHEET NOTE
[facility] [address] [facility] [address]  Phone: [phone] Fax: [phone]      Physical Therapy Daily Treatment Note  Date:  4/3/2019    Patient Name:  Fabiola Payton    :  1995  MRN: [id number]  Restrictions/Precautions:    Medical/Treatment Diagnosis Information:  Diagnosis: s/p right UCL repair     Insurance/Certification information:     Physician Information:  Referring Practitioner: Caryle Jude, MD  Plan of care signed (Y/N):     Date of Patient follow up with Physician:     G-Code (if applicable):      Date G-Code Applied:         Progress Note: [x]  Yes  []  No  Next due by: Visit #10      Latex Allergy:  [x]NO      []YES  Preferred Language for Healthcare:   [x]English       []other:    Visit # Insurance Allowable   10 120     Pain level:  0-3/10     SUBJECTIVE:  Patient reports that his elbow is doing great. No complaints of pain entering PT today. OBJECTIVE: noted decreased swelling through right elbow. Incision healing well. Observation: Pt arrives without brace today.    Test measurements:  Ext =5, eeil=716    RESTRICTIONS/PRECAUTIONS: per [facility] protocol    Exercises/Interventions:   Therapeutic Ex (43391) X30 Sets/sec Reps CUES/Notes   UBE 5'  Low position and resistance level   Supine D2 2 10 Blue    90/90 R/S 20 2    4 way Isomet 10\" 10 Flex, abd, IR, ext   T- band Row/pinch 2 10 red   T- band lower pinch 2 10 red   T- band ER activation 2 10 red   Supine SA punch 2 10 2# 5\"   SL ER 2 10 2#   Prone Rows, horix abd, flex (with elbow flexed) 2 10 2#   Putty , finger pinch, extension each   Seat Table slides/Wall Slides      Table protraction 2 10    Wall push ups 2 10 Easy    Scap Wall Lat touches/wall walks      scap squeezes 5s 20    Standing flex/scap 2 20 2# @ mirror   Standing Punch      Lawnmower      Wrist flexion/ext/RD 2 10 2#   Active pronation/supination 1 20 2#   Prone ER 2 10 0#   90/90 TB ER 2 10 red   Manual Intervention  (40202) X15      Shld /GH Mobs      Post Cap mobs      Thoracic/Rib manipualtion      CT MT/Mobs      PROM MT      Elbow gentle PROM/STM distal tricep 10'     Shoulder PROM 5'     NMR re-education (54785)      T-spine Ext      GH depress/compress      Scap/GH NMR      Body blade 20 2 Medium BB flexion and abduction @ shoulder height   Wall ball roll      Wall Ball bounce      Ball drops      Lauren Scap Bio      Floor Snow angels-sliders            Therapeutic Activity (16326)      UE throwing porgression      Dynamic UE stability      Earthquake Bar      Bodyblade                Therapeutic Exercise and NMR EXR  [x] (22430) Provided verbal/tactile cueing for activities related to strengthening, flexibility, endurance, ROM  for improvements in scapular, scapulothoracic and UE control with self care, reaching, carrying, lifting, house/yardwork, driving/computer work.    [] (41341) Provided verbal/tactile cueing for activities related to improving balance, coordination, kinesthetic sense, posture, motor skill, proprioception  to assist with  scapular, scapulothoracic and UE control with self care, reaching, carrying, lifting, house/yardwork, driving/computer work. Therapeutic Activities:    [] (36626 or 35442) Provided verbal/tactile cueing for activities related to improving balance, coordination, kinesthetic sense, posture, motor skill, proprioception and motor activation to allow for proper function of scapular, scapulothoracic and UE control with self care, carrying, lifting, driving/computer work.      Home Exercise Program:    [x] (90563) Reviewed/Progressed HEP activities related to strengthening, flexibility, endurance, ROM of scapular, scapulothoracic and UE control with self care, reaching, carrying, lifting, house/yardwork, driving/computer work  [] (46743) Reviewed/Progressed HEP activities related to improving balance, coordination, kinesthetic sense, posture, motor skill, proprioception of scapular, scapulothoracic and UE control with self care, reaching, carrying, lifting, house/yardwork, driving/computer work      Manual Treatments:  PROM / STM / Oscillations-Mobs:  G-I, II, III, IV (Bianca, Inf., Post.)  [x] (42451) Provided manual therapy to mobilize soft tissue/joints of cervical/CT, scapular GHJ and UE for the purpose of modulating pain, promoting relaxation,  increasing ROM, reducing/eliminating soft tissue swelling/inflammation/restriction, improving soft tissue extensibility and allowing for proper ROM for normal function with self care, reaching, carrying, lifting, house/yardwork, driving/computer work    Modalities:  none    Charges:  Timed Code Treatment Minutes: 45   Total Treatment Minutes: 45     [] EVAL (LOW) 75356 (typically 20 minutes face-to-face)  [] EVAL (MOD) 69647 (typically 30 minutes face-to-face)  [] EVAL (HIGH) 93497 (typically 45 minutes face-to-face)  [] RE-EVAL     [x] GC(68061) x  2   [] IONTO  [] NMR (55486) x      [] VASO  [x] Manual (34247) x  1    [] Other:  [] TA x       [] Mech Traction (84045)  [] ES(attended) (63343)      [] ES (un) (39045):     GOALS:  Patient stated goal: return to throwing    Therapist goals for Patient:   Short Term Goals: To be achieved in: 2 weeks  1. Independent in HEP and progression per patient tolerance, in order to prevent re-injury. 2. Patient will have a decrease in pain to facilitate improvement in movement, function, and ADLs as indicated by Functional Deficits. Long Term Goals: To be achieved in: 4-6 weeks  1. Disability index score of 20% or less for the DASH to assist with reaching prior level of function. 2. Patient will demonstrate increased AROM to [facility] to allow for proper joint functioning as indicated by patients Functional Deficits.    3. Patient will demonstrate an increase in Strength to good scapular and core control, w/in 5lbs HHD for UE to allow for proper functional mobility as indicated by

## 2019-04-08 ENCOUNTER — HOSPITAL ENCOUNTER (OUTPATIENT)
Dept: PHYSICAL THERAPY | Age: 24
Setting detail: THERAPIES SERIES
Discharge: HOME | End: 2019-04-08
Payer: COMMERCIAL

## 2019-04-08 PROCEDURE — 97110 THERAPEUTIC EXERCISES: CPT

## 2019-04-08 PROCEDURE — 97112 NEUROMUSCULAR REEDUCATION: CPT

## 2019-04-08 PROCEDURE — 97140 MANUAL THERAPY 1/> REGIONS: CPT

## 2019-04-08 NOTE — PLAN OF CARE
level:  0-3/10     SUBJECTIVE:  Patient reports right elbow is feeling really good with progress overall. Some increased tightness today through the bicep for some reason. OBJECTIVE: noted decreased swelling through right elbow. Incision healing well.    MMT: bicep 4+/5, tricep 4+/5, wrist ext 4/5, wrist flex 4/5   Test measurements:  Ext =5 with forearm supinated, flex=140    RESTRICTIONS/PRECAUTIONS: per [facility] protocol    Exercises/Interventions:   Therapeutic Ex (65501) X30 Sets/sec Reps CUES/Notes   UBE 5'  Low position and resistance level   Supine D2 2 10 Blue    90/90 R/S    4 way Isomet Flex, abd, IR, ext   T- band Row/pinch 2 10 red   T- band lower pinch 2 10 red   T- band ER activation 2 10 red   Supine SA punch 2 10 4# 5\"   SL ER 2 10 4#   Prone Rows, horix abd, flex (with elbow flexed) 2 10 4#   Putty , finger pinch, extension each   Seat Table slides/Wall Slides      Table protraction    Wall push ups Easy    Scap Wall Lat touches/wall walks      scap squeezes    Standing flex/scap 4# @ mirror   Standing Punch      Elbow flexion/extension 2 10 4#   Wrist flexion/ext/RD 2 10 4#   Active pronation/supination 1 20 4#   Prone ER 2 10 2#   90/90 TB ER 2 10 red   Manual Intervention  (69012) X20      Shld /GH Mobs      Post Cap mobs      Thoracic/Rib manipualtion      CT MT/Mobs      PROM MT      Elbow gentle PROM/STM distal tricep 10'     Shoulder PROM 5'     NMR re-education (78517) X15      T-spine Ext      GH depress/compress      Scap/GH NMR      Body blade 30 2 Medium BB flexion and abduction @ shoulder height   Wall ball roll 2 20 yellow   Ball D2 flexion/extension 2 10    Wall diagonals 3 5 orange   Lauren Scap Bio      Floor Snow angels-sliders            Therapeutic Activity (80595)      UE throwing porgression      Dynamic UE stability      Earthquake Bar      Bodyblade                Therapeutic Exercise and NMR EXR  [x] (13981) Provided verbal/tactile cueing for activities related to strengthening, flexibility, endurance, ROM  for improvements in scapular, scapulothoracic and UE control with self care, reaching, carrying, lifting, house/yardwork, driving/computer work.    [] (36664) Provided verbal/tactile cueing for activities related to improving balance, coordination, kinesthetic sense, posture, motor skill, proprioception  to assist with  scapular, scapulothoracic and UE control with self care, reaching, carrying, lifting, house/yardwork, driving/computer work. Therapeutic Activities:    [] (54749 or 91483) Provided verbal/tactile cueing for activities related to improving balance, coordination, kinesthetic sense, posture, motor skill, proprioception and motor activation to allow for proper function of scapular, scapulothoracic and UE control with self care, carrying, lifting, driving/computer work.      Home Exercise Program:    [x] (70568) Reviewed/Progressed HEP activities related to strengthening, flexibility, endurance, ROM of scapular, scapulothoracic and UE control with self care, reaching, carrying, lifting, house/yardwork, driving/computer work  [] (79530) Reviewed/Progressed HEP activities related to improving balance, coordination, kinesthetic sense, posture, motor skill, proprioception of scapular, scapulothoracic and UE control with self care, reaching, carrying, lifting, house/yardwork, driving/computer work      Manual Treatments:  PROM / STM / Oscillations-Mobs:  G-I, II, III, IV (PA's, Inf., Post.)  [x] (53442) Provided manual therapy to mobilize soft tissue/joints of cervical/CT, scapular GHJ and UE for the purpose of modulating pain, promoting relaxation,  increasing ROM, reducing/eliminating soft tissue swelling/inflammation/restriction, improving soft tissue extensibility and allowing for proper ROM for normal function with self care, reaching, carrying, lifting, house/yardwork, driving/computer work    Modalities:  none    Charges:  Timed Code Treatment Minutes: 65 Total Treatment Minutes: 65     [] EVAL (LOW) 37213 (typically 20 minutes face-to-face)  [] EVAL (MOD) 46196 (typically 30 minutes face-to-face)  [] EVAL (HIGH) 79517 (typically 45 minutes face-to-face)  [] RE-EVAL     [x] WR(70600) x  2   [] IONTO  [x] NMR (52358) x  1   [] VASO  [x] Manual (44889) x  1    [] Other:  [] TA x       [] Mech Traction (41622)  [] ES(attended) (95387)      [] ES (un) (87662):     GOALS:  Patient stated goal: return to throwing    Therapist goals for Patient:   Short Term Goals: To be achieved in: 2 weeks  1. Independent in HEP and progression per patient tolerance, in order to prevent re-injury. 2. Patient will have a decrease in pain to facilitate improvement in movement, function, and ADLs as indicated by Functional Deficits. Long Term Goals: To be achieved in: 4-6 weeks  1. Disability index score of 20% or less for the DASH to assist with reaching prior level of function. 2. Patient will demonstrate increased AROM to [facility] to allow for proper joint functioning as indicated by patients Functional Deficits. 3. Patient will demonstrate an increase in Strength to good scapular and core control, w/in 5lbs HHD for UE to allow for proper functional mobility as indicated by patients Functional Deficits. 4. Patient will return to gripping, throwing, reaching activities without increased symptoms or restriction. Progression Towards Functional goals:  [x] Patient is progressing as expected towards functional goals listed. [] Progression is slowed due to complexities listed. [] Progression has been slowed due to co-morbidities. [] Plan just implemented, too soon to assess goals progression  [] Other:     ASSESSMENT:  Patient has made great gains with therapy to date for right elbow.  He would benefit from continued strengthening and progress toward return to throw program.       Return to Play: (if applicable)   []  Stage 1: Intro to Strength   []  Stage 2: Dynamic Strength

## 2019-04-10 ENCOUNTER — HOSPITAL ENCOUNTER (OUTPATIENT)
Dept: PHYSICAL THERAPY | Age: 24
Setting detail: THERAPIES SERIES
Discharge: HOME | End: 2019-04-10
Payer: COMMERCIAL

## 2019-04-10 PROCEDURE — 97110 THERAPEUTIC EXERCISES: CPT

## 2019-04-10 PROCEDURE — 97140 MANUAL THERAPY 1/> REGIONS: CPT

## 2019-04-10 PROCEDURE — 97112 NEUROMUSCULAR REEDUCATION: CPT

## 2019-04-10 PROCEDURE — 97124 MASSAGE THERAPY: CPT

## 2019-04-10 NOTE — FLOWSHEET NOTE
[facility] [address] [facility] [address]  Phone: [phone] Fax: [phone]          Date:  4/10/2019    Patient Name:  Marva Fernandes    :  1995  MRN: [id number]  Restrictions/Precautions:    Medical/Treatment Diagnosis Information:  Diagnosis: s/p right UCL repair     Insurance/Certification information:     Physician Information:  Referring Practitioner: Anu Hogan MD  Plan of care signed (Y/N):     Date of Patient follow up with Physician:     G-Code (if applicable):      Date G-Code Applied:         Progress Note: [x]  Yes  []  No  Next due by: Visit #10      Latex Allergy:  [x]NO      []YES  Preferred Language for Healthcare:   [x]English       []other:    Visit # Insurance Allowable   10 120     Pain level:  0-3/10     SUBJECTIVE:  Patient reports that his elbow is feeling good, no complaints of pain or soreness with new exercises last visit. OBJECTIVE: noted decreased swelling through right elbow. Incision healing well.    MMT: bicep 4+/5, tricep 4+/5, wrist ext 4/5, wrist flex 4/5   Test measurements:  Ext =5 with forearm supinated, flex=140    RESTRICTIONS/PRECAUTIONS: per [facility] protocol    Exercises/Interventions:   Therapeutic Ex (22707) X30 Sets/sec Reps CUES/Notes   UBE 5'  Low position and resistance level   Supine D2 2 10 Blue    90/90 R/S    4 way Isomet Flex, abd, IR, ext   T- band Row/pinch 2 10 red   T- band lower pinch 2 10 red   T- band ER activation 2 10 red   Supine SA punch 2 10 4# 5\"   SL ER 2 10 4#   Prone Rows, horix abd, flex (with elbow flexed) 2 10 4#   Putty , finger pinch, extension each   Seat Table slides/Wall Slides      Table protraction    Wall push ups Easy    Scap Wall Lat touches/wall walks      scap squeezes    Standing flex/scap 4# @ mirror   Standing Punch      Elbow flexion/extension 2 10 4#   Wrist flexion/ext/RD 2 10 4#   Active pronation/supination 1 20 4#   Prone ER 2 10 2# kinesthetic sense, posture, motor skill, proprioception of scapular, scapulothoracic and UE control with self care, reaching, carrying, lifting, house/yardwork, driving/computer work      Manual Treatments:  PROM / STM / Oscillations-Mobs:  G-I, II, III, IV (PA's, Inf., Post.)  [x] (35984) Provided manual therapy to mobilize soft tissue/joints of cervical/CT, scapular GHJ and UE for the purpose of modulating pain, promoting relaxation,  increasing ROM, reducing/eliminating soft tissue swelling/inflammation/restriction, improving soft tissue extensibility and allowing for proper ROM for normal function with self care, reaching, carrying, lifting, house/yardwork, driving/computer work    Modalities:  none    Charges:  Timed Code Treatment Minutes: 65   Total Treatment Minutes: 65     [] EVAL (LOW) 57651 (typically 20 minutes face-to-face)  [] EVAL (MOD) 93233 (typically 30 minutes face-to-face)  [] EVAL (HIGH) 23045 (typically 45 minutes face-to-face)  [] RE-EVAL     [x] QD(23678) x  2   [] IONTO  [x] NMR (96202) x  1   [] VASO  [x] Manual (34260) x  1    [] Other:  [] TA x       [] Mech Traction (41062)  [] ES(attended) (69642)      [] ES (un) (73331):     GOALS:  Patient stated goal: return to throwing    Therapist goals for Patient:   Short Term Goals: To be achieved in: 2 weeks  1. Independent in HEP and progression per patient tolerance, in order to prevent re-injury. 2. Patient will have a decrease in pain to facilitate improvement in movement, function, and ADLs as indicated by Functional Deficits. Long Term Goals: To be achieved in: 4-6 weeks  1. Disability index score of 20% or less for the DASH to assist with reaching prior level of function. 2. Patient will demonstrate increased AROM to [facility] to allow for proper joint functioning as indicated by patients Functional Deficits.    3. Patient will demonstrate an increase in Strength to good scapular and core control, w/in 5lbs HHD for UE to allow for proper functional mobility as indicated by patients Functional Deficits. 4. Patient will return to gripping, throwing, reaching activities without increased symptoms or restriction. Progression Towards Functional goals:  [x] Patient is progressing as expected towards functional goals listed. [] Progression is slowed due to complexities listed. [] Progression has been slowed due to co-morbidities. [] Plan just implemented, too soon to assess goals progression  [] Other:     ASSESSMENT:  Patient has made great gains with therapy to date for right elbow.  He would benefit from continued strengthening and progress toward return to throw program.       Return to Play: (if applicable)   []  Stage 1: Intro to Strength   []  Stage 2: Dynamic Strength and Intro to Plyometrics   []  Stage 3: Advanced Plyometrics and Intro to Throwing   []  Stage 4: Sport specific Training/Return to Sport     []  Ready to Return to Play, [employer]   []  Not Ready for Return to Sports   Comments:      Treatment/Activity Tolerance:  [x] Patient tolerated treatment well [] Patient limited by fatique  [] Patient limited by pain  [] Patient limited by other medical complications  [] Other:     Prognosis: [x] Good [] Fair  [] Poor    Patient Requires Follow-up: [x] Yes  [] No    PLAN:   [x] Continue per plan of care [] Alter current plan (see comments)  [] Plan of care initiated [] Hold pending MD visit [] Discharge    Electronically signed by: Sherrell Ashford PTA

## 2019-04-15 ENCOUNTER — HOSPITAL ENCOUNTER (OUTPATIENT)
Dept: PHYSICAL THERAPY | Age: 24
Setting detail: THERAPIES SERIES
Discharge: HOME | End: 2019-04-15
Payer: COMMERCIAL

## 2019-04-15 PROCEDURE — 97112 NEUROMUSCULAR REEDUCATION: CPT

## 2019-04-15 PROCEDURE — 97140 MANUAL THERAPY 1/> REGIONS: CPT

## 2019-04-15 PROCEDURE — 97110 THERAPEUTIC EXERCISES: CPT

## 2019-04-15 NOTE — FLOWSHEET NOTE
[place] [address] [facility] [address]  Phone: [phone] Fax: [phone]          Date:  4/15/2019    Patient Name:  Andrews Williamson    :  1995  MRN: [id number]  Restrictions/Precautions:    Medical/Treatment Diagnosis Information:  Diagnosis: s/p right UCL repair     Insurance/Certification information:     Physician Information:  Referring Practitioner: Lissett Pastrana MD  Plan of care signed (Y/N):     Date of Patient follow up with Physician:     G-Code (if applicable):      Date G-Code Applied:         Progress Note: [x]  Yes  []  No  Next due by: Visit #10      Latex Allergy:  [x]NO      []YES  Preferred Language for Healthcare:   [x]English       []other:    Visit # Insurance Allowable   11 120     Pain level:  0-3/10     SUBJECTIVE:  Patient reports that his elbow is doing great. Trainer was able to get elbow to 0° and cupped gently along incision. OBJECTIVE: noted decreased swelling through right elbow. Incision healing well.    MMT: bicep 4+/5, tricep 4+/5, wrist ext 4/5, wrist flex 4/5   Test measurements:  Ext =5 with forearm supinated, flex=140    RESTRICTIONS/PRECAUTIONS: per [facility] protocol    Exercises/Interventions:   Therapeutic Ex (81030) X30 Sets/sec Reps CUES/Notes   UBE 5'  Low position and resistance level   Supine D2 2 10 Blue    90/90 R/S    4 way Isomet Flex, abd, IR, ext   T- band Row/pinch 2 10 red   T- band lower pinch 2 10 red   T- band ER activation 2 10 red   Supine SA punch 2 10 4# 5\"   SL ER 2 10 4#   Prone Rows, horix abd, flex (with elbow flexed) 2 10 4#   Putty , finger pinch, extension each   Seat Table slides/Wall Slides      Table protraction    Wall push ups Easy    Scap Wall Lat touches/wall walks      scap squeezes    Standing flex/scap 4# @ mirror   Standing Punch      Elbow flexion/extension 2 10 4#   Wrist flexion/ext/RD 2 10 4#   Active pronation/supination 1 20 4#   Prone ER 2 10 2#   90/90 TB ER 2 10 red   Manual Intervention  (23956) X20      Shld /GH Mobs      Post Cap mobs      Thoracic/Rib manipualtion      CT MT/Mobs      PROM MT      Elbow gentle PROM/STM distal tricep 10'     Shoulder PROM 5'     NMR re-education (80223) X15      T-spine Ext      GH depress/compress      Scap/GH NMR      Body blade 30 2 Medium BB throwing and abduction @ shoulder height   Wall ball roll 2 20 yellow   Wall diagonals 3 5 orange   Lauren Scap Bio      Floor Snow angels-sliders            Therapeutic Activity (15609)      UE throwing porgression      Dynamic UE stability      Earthquake Bar      Bodyblade      Supine ecc ball press 1 20        Therapeutic Exercise and NMR EXR  [x] (08328) Provided verbal/tactile cueing for activities related to strengthening, flexibility, endurance, ROM  for improvements in scapular, scapulothoracic and UE control with self care, reaching, carrying, lifting, house/yardwork, driving/computer work.    [] (12151) Provided verbal/tactile cueing for activities related to improving balance, coordination, kinesthetic sense, posture, motor skill, proprioception  to assist with  scapular, scapulothoracic and UE control with self care, reaching, carrying, lifting, house/yardwork, driving/computer work. Therapeutic Activities:    [] (42659 or 07253) Provided verbal/tactile cueing for activities related to improving balance, coordination, kinesthetic sense, posture, motor skill, proprioception and motor activation to allow for proper function of scapular, scapulothoracic and UE control with self care, carrying, lifting, driving/computer work.      Home Exercise Program:    [x] (49732) Reviewed/Progressed HEP activities related to strengthening, flexibility, endurance, ROM of scapular, scapulothoracic and UE control with self care, reaching, carrying, lifting, house/yardwork, driving/computer work  [] (78422) Reviewed/Progressed HEP activities related to improving balance, coordination, kinesthetic sense, posture, motor skill, proprioception of scapular, scapulothoracic and UE control with self care, reaching, carrying, lifting, house/yardwork, driving/computer work      Manual Treatments:  PROM / STM / Oscillations-Mobs:  G-I, II, III, IV (PA's, Inf., Post.)  [x] (08254) Provided manual therapy to mobilize soft tissue/joints of cervical/CT, scapular GHJ and UE for the purpose of modulating pain, promoting relaxation,  increasing ROM, reducing/eliminating soft tissue swelling/inflammation/restriction, improving soft tissue extensibility and allowing for proper ROM for normal function with self care, reaching, carrying, lifting, house/yardwork, driving/computer work    Modalities:  none    Charges:  Timed Code Treatment Minutes: 65   Total Treatment Minutes: 65     [] EVAL (LOW) 83697 (typically 20 minutes face-to-face)  [] EVAL (MOD) 13149 (typically 30 minutes face-to-face)  [] EVAL (HIGH) 90384 (typically 45 minutes face-to-face)  [] RE-EVAL     [x] XG(38370) x  2   [] IONTO  [x] NMR (74669) x  1   [] VASO  [x] Manual (11552) x  1    [] Other:  [] TA x       [] Mech Traction (64347)  [] ES(attended) (29469)      [] ES (un) (67397):     GOALS:  Patient stated goal: return to throwing    Therapist goals for Patient:   Short Term Goals: To be achieved in: 2 weeks  1. Independent in HEP and progression per patient tolerance, in order to prevent re-injury. 2. Patient will have a decrease in pain to facilitate improvement in movement, function, and ADLs as indicated by Functional Deficits. Long Term Goals: To be achieved in: 4-6 weeks  1. Disability index score of 20% or less for the DASH to assist with reaching prior level of function. 2. Patient will demonstrate increased AROM to [facility] to allow for proper joint functioning as indicated by patients Functional Deficits.    3. Patient will demonstrate an increase in Strength to good scapular and core control, w/in 5lbs HHD for UE to allow for proper functional mobility as indicated by patients Functional Deficits. 4. Patient will return to gripping, throwing, reaching activities without increased symptoms or restriction. Progression Towards Functional goals:  [x] Patient is progressing as expected towards functional goals listed. [] Progression is slowed due to complexities listed. [] Progression has been slowed due to co-morbidities. [] Plan just implemented, too soon to assess goals progression  [] Other:     ASSESSMENT:  Patient has made great gains with therapy to date for right elbow.  He would benefit from continued strengthening and progress toward return to throw program.       Return to Play: (if applicable)   []  Stage 1: Intro to Strength   []  Stage 2: Dynamic Strength and Intro to Plyometrics   []  Stage 3: Advanced Plyometrics and Intro to Throwing   []  Stage 4: Sport specific Training/Return to Sport     []  Ready to Return to Play, [employer]   []  Not Ready for Return to Sports   Comments:      Treatment/Activity Tolerance:  [x] Patient tolerated treatment well [] Patient limited by fatique  [] Patient limited by pain  [] Patient limited by other medical complications  [] Other:     Prognosis: [x] Good [] Fair  [] Poor    Patient Requires Follow-up: [x] Yes  [] No    PLAN:   [x] Continue per plan of care [] Alter current plan (see comments)  [] Plan of care initiated [] Hold pending MD visit [] Discharge    Electronically signed by: Chiquita Evans PTA

## 2019-04-17 ENCOUNTER — HOSPITAL ENCOUNTER (OUTPATIENT)
Dept: PHYSICAL THERAPY | Age: 24
Setting detail: THERAPIES SERIES
Discharge: HOME | End: 2019-04-17
Payer: COMMERCIAL

## 2019-04-17 PROCEDURE — 97110 THERAPEUTIC EXERCISES: CPT

## 2019-04-17 PROCEDURE — 97112 NEUROMUSCULAR REEDUCATION: CPT

## 2019-04-17 PROCEDURE — 97140 MANUAL THERAPY 1/> REGIONS: CPT

## 2019-04-17 NOTE — FLOWSHEET NOTE
sense, posture, motor skill, proprioception of scapular, scapulothoracic and UE control with self care, reaching, carrying, lifting, house/yardwork, driving/computer work      Manual Treatments:  PROM / STM / Oscillations-Mobs:  G-I, II, III, IV (PA's, Inf., Post.)  [x] (37201) Provided manual therapy to mobilize soft tissue/joints of cervical/CT, scapular GHJ and UE for the purpose of modulating pain, promoting relaxation,  increasing ROM, reducing/eliminating soft tissue swelling/inflammation/restriction, improving soft tissue extensibility and allowing for proper ROM for normal function with self care, reaching, carrying, lifting, house/yardwork, driving/computer work    Modalities:  none    Charges:  Timed Code Treatment Minutes: 60   Total Treatment Minutes: 60     [] EVAL (LOW) 40902 (typically 20 minutes face-to-face)  [] EVAL (MOD) 34835 (typically 30 minutes face-to-face)  [] EVAL (HIGH) 83448 (typically 45 minutes face-to-face)  [] RE-EVAL     [x] EA(60289) x  1   [] IONTO  [x] NMR (61612) x  2   [] VASO  [x] Manual (20096) x  1    [] Other:  [] TA x       [] Mech Traction (67089)  [] ES(attended) (62696)      [] ES (un) (69783):     GOALS:  Patient stated goal: return to throwing    Therapist goals for Patient:   Short Term Goals: To be achieved in: 2 weeks  1. Independent in HEP and progression per patient tolerance, in order to prevent re-injury. 2. Patient will have a decrease in pain to facilitate improvement in movement, function, and ADLs as indicated by Functional Deficits. Long Term Goals: To be achieved in: 4-6 weeks  1. Disability index score of 20% or less for the DASH to assist with reaching prior level of function. 2. Patient will demonstrate increased AROM to [facility] to allow for proper joint functioning as indicated by patients Functional Deficits.    3. Patient will demonstrate an increase in Strength to good scapular and core control, w/in 5lbs HHD for UE to allow for proper functional mobility as indicated by patients Functional Deficits. 4. Patient will return to gripping, throwing, reaching activities without increased symptoms or restriction. Progression Towards Functional goals:  [x] Patient is progressing as expected towards functional goals listed. [] Progression is slowed due to complexities listed. [] Progression has been slowed due to co-morbidities. [] Plan just implemented, too soon to assess goals progression  [] Other:     ASSESSMENT:  Patient continues to make great gains with therapy. We discussed being able to drop to 1X/week until week 14 in protocol. Patiient to work more with ATC on scapular stability, elbow flexion/extension strength and return to weight room with the only limitations being fly and push up. Relayed this info to ATC.        Return to Play: (if applicable)   []  Stage 1: Intro to Strength   []  Stage 2: Dynamic Strength and Intro to Plyometrics   []  Stage 3: Advanced Plyometrics and Intro to Throwing   []  Stage 4: Sport specific Training/Return to Sport     []  Ready to Return to Play, [employer]   []  Not Ready for Return to Sports   Comments:      Treatment/Activity Tolerance:  [x] Patient tolerated treatment well [] Patient limited by fatique  [] Patient limited by pain  [] Patient limited by other medical complications  [] Other:     Prognosis: [x] Good [] Fair  [] Poor    Patient Requires Follow-up: [x] Yes  [] No    PLAN:   [x] Continue per plan of care [] Alter current plan (see comments)  [] Plan of care initiated [] Hold pending MD visit [] Discharge    Electronically signed by: Patrick Waggoner PT

## 2019-04-24 ENCOUNTER — HOSPITAL ENCOUNTER (OUTPATIENT)
Dept: PHYSICAL THERAPY | Age: 24
Setting detail: THERAPIES SERIES
Discharge: HOME | End: 2019-04-24
Payer: COMMERCIAL

## 2019-04-24 PROCEDURE — 97112 NEUROMUSCULAR REEDUCATION: CPT

## 2019-04-24 PROCEDURE — 97140 MANUAL THERAPY 1/> REGIONS: CPT

## 2019-04-24 PROCEDURE — 97110 THERAPEUTIC EXERCISES: CPT

## 2019-04-24 NOTE — FLOWSHEET NOTE
[place] [address] [address] [place] [address]  Phone: [phone] Fax: [phone]          Date:  2019    Patient Name:  Nia Olvera    :  1995  MRN: [id number]  Restrictions/Precautions:    Medical/Treatment Diagnosis Information:  Diagnosis: s/p right UCL repair     Insurance/Certification information:     Physician Information:  Referring Practitioner: Harmeet Beltran MD  Plan of care signed (Y/N):     Date of Patient follow up with Physician:     G-Code (if applicable):      Date G-Code Applied:         Progress Note: [x]  Yes  []  No  Next due by: Visit #10      Latex Allergy:  [x]NO      []YES  Preferred Language for Healthcare:   [x]English       []other:    Visit # Insurance Allowable   13 120     Pain level:  0-3/10      SUBJECTIVE:  Pt states that he is now 11 weeks post surgery. Patient reports continued gains per protocol. No episodes of pain or discomfort with daily activities. Pt has been working with ATC and all seems to be going well. A little tight through wrist flexors today. OBJECTIVE:  Discussed prayer stretch for HEP to work on wrist extension.    MMT: bicep 4+/5, tricep 5/5, wrist ext 4+/5, wrist flex 4+/5   Test measurements:  Ext =0 with forearm supinated, flex=140    RESTRICTIONS/PRECAUTIONS: per [facility] protocol    Exercises/Interventions:   Therapeutic Ex (10162) X20 Sets/sec Reps CUES/Notes   UBE 2/2  Low position and resistance level   Supine D2 Blue    90/90 R/S    4 way Isomet Flex, abd, IR, ext   T- band Row/pinch red   T- band lower pinch red   T- band ER activation red   Supine SA punch c BOSU bridge 2 10 5# 5\"   SL ER 2 10 4#   Prone Rows, horix abd, flex (with elbow flexed) 2 10 5#   Putty , finger pinch, extension each   Seat Table slides/Wall Slides      Table protraction    Wall push ups Easy    Scap Wall Lat touches/wall walks      scap squeezes    Standing flex/scap 2 10 5lb, BOSU lunge @ mirror   Standing Punch      Elbow flexion/extension 2 10 5#   Wrist flexion/ext/RD Active pronation/supination 1 20 5#   Prone ER 2 10 2#   90/90 TB ER red   Manual Intervention  (89741) X10      Shld /GH Mobs      Post Cap mobs      Thoracic/Rib manipualtion      CT MT/Mobs      PROM MT      Elbow gentle PROM/GISTM proximal wrist flexors 10'     Shoulder PROM      NMR re-education (20187) X18      T-spine Ext      GH depress/compress      Scap/GH NMR      Body blade c red band @ wrist for shld ER nm control 30 2 Medium BB throwing and abduction @ shoulder height, ER at 90* in half kneeling on BOSU   Wall ball roll 2 20 Yellow, flexion/scaption   Ball D2 flexion/extension  red ball   Wall diagonals red   Ball drops  2 20 horiz abd, 90/90, 21oz ball   Floor Snow angels-sliders            Therapeutic Activity (51508) x 4 min      UE throwing porgression      Dynamic UE stability 1 20 VB chest pass into rebounder, kneeling on BOSU, needs toes on floor per poor core control   Earthquake Bar      Bodyblade      Supine ecc ball press        Therapeutic Exercise and NMR EXR  [x] (46939) Provided verbal/tactile cueing for activities related to strengthening, flexibility, endurance, ROM  for improvements in scapular, scapulothoracic and UE control with self care, reaching, carrying, lifting, house/yardwork, driving/computer work.    [] (30449) Provided verbal/tactile cueing for activities related to improving balance, coordination, kinesthetic sense, posture, motor skill, proprioception  to assist with  scapular, scapulothoracic and UE control with self care, reaching, carrying, lifting, house/yardwork, driving/computer work.     Therapeutic Activities:    [x] (94600 or 67844) Provided verbal/tactile cueing for activities related to improving balance, coordination, kinesthetic sense, posture, motor skill, proprioception and motor activation to allow for proper function of scapular, scapulothoracic and UE control with self care, carrying, lifting, driving/computer work. Home Exercise Program:    [x] (44201) Reviewed/Progressed HEP activities related to strengthening, flexibility, endurance, ROM of scapular, scapulothoracic and UE control with self care, reaching, carrying, lifting, house/yardwork, driving/computer work  [] (68916) Reviewed/Progressed HEP activities related to improving balance, coordination, kinesthetic sense, posture, motor skill, proprioception of scapular, scapulothoracic and UE control with self care, reaching, carrying, lifting, house/yardwork, driving/computer work      Manual Treatments:  PROM / STM / Oscillations-Mobs:  G-I, II, III, IV (PA's, Inf., Post.)  [x] (71750) Provided manual therapy to mobilize soft tissue/joints of cervical/CT, scapular GHJ and UE for the purpose of modulating pain, promoting relaxation,  increasing ROM, reducing/eliminating soft tissue swelling/inflammation/restriction, improving soft tissue extensibility and allowing for proper ROM for normal function with self care, reaching, carrying, lifting, house/yardwork, driving/computer work    Modalities:  none    Charges:  Timed Code Treatment Minutes: 52   Total Treatment Minutes: 52     [] EVAL (LOW) 58163 (typically 20 minutes face-to-face)  [] EVAL (MOD) 26639 (typically 30 minutes face-to-face)  [] EVAL (HIGH) 01143 (typically 45 minutes face-to-face)  [] RE-EVAL     [x] YE(46615) x  1   [] IONTO  [x] NMR (21304) x  1   [] VASO  [x] Manual (99447) x  1    [] Other:  [] TA x       [] Mech Traction (80189)  [] ES(attended) (85288)      [] ES (un) (08503):     GOALS:  Patient stated goal: return to throwing    Therapist goals for Patient:   Short Term Goals: To be achieved in: 2 weeks  1. Independent in HEP and progression per patient tolerance, in order to prevent re-injury. 2. Patient will have a decrease in pain to facilitate improvement in movement, function, and ADLs as indicated by Functional Deficits.     Long Term comments)  [] Plan of care initiated [] Hold pending MD visit [] Discharge    Electronically signed by: Thong Crow PTA

## 2019-05-01 ENCOUNTER — HOSPITAL ENCOUNTER (OUTPATIENT)
Dept: PHYSICAL THERAPY | Age: 24
Setting detail: THERAPIES SERIES
Discharge: HOME | End: 2019-05-01
Payer: COMMERCIAL

## 2019-05-01 PROCEDURE — 97112 NEUROMUSCULAR REEDUCATION: CPT

## 2019-05-01 PROCEDURE — 97530 THERAPEUTIC ACTIVITIES: CPT

## 2019-05-01 PROCEDURE — 97140 MANUAL THERAPY 1/> REGIONS: CPT

## 2019-05-01 PROCEDURE — 97110 THERAPEUTIC EXERCISES: CPT

## 2019-05-01 NOTE — FLOWSHEET NOTE
[facility] [address] [facility] [address]  Phone: [phone] Fax: [phone]          Date:  2019    Patient Name:  Gabrielle Guerin    :  1995  MRN: [id number]  Restrictions/Precautions:    Medical/Treatment Diagnosis Information:  Diagnosis: s/p right UCL repair     Insurance/Certification information:     Physician Information:  Referring Practitioner: Alex Curtis MD  Plan of care signed (Y/N):     Date of Patient follow up with Physician:     G-Code (if applicable):      Date G-Code Applied:         Progress Note: [x]  Yes  []  No  Next due by: Visit #10      Latex Allergy:  [x]NO      []YES  Preferred Language for Healthcare:   [x]English       []other:    Visit # Insurance Allowable   14 120     Pain level:  0-3/10      SUBJECTIVE:  Pt states that he is now 12 weeks post op. Progressing well but notes mild muscle soreness with increasing activity. OBJECTIVE:  Discussed prayer stretch for HEP to work on wrist extension.    MMT: bicep 4+/5, tricep 5/5, wrist ext 4+/5, wrist flex 4+/5   Test measurements:  Ext =0 with forearm supinated, flex=140    RESTRICTIONS/PRECAUTIONS: per [facility] protocol    Exercises/Interventions:   Therapeutic Ex (09447) X20 Sets/sec Reps CUES/Notes   UBE 2/2  Low position and resistance level   Supine D2 Blue    90/90 R/S    4 way Isomet Flex, abd, IR, ext   T- band Row/pinch red   T- band lower pinch red   T- band ER activation red   Supine SA punch c BOSU bridge 2 10 5# 5\"   SL ER 2 10 4#   Prone Rows, horix abd, flex (with elbow flexed) 2 10 5#   Putty , finger pinch, extension each   Seat Table slides/Wall Slides      Table protraction    Wall push ups Easy    Full flexion retro lunge 2 10 red   scap squeezes    Standing flex/scap 2 10 5lb, BOSU lunge @ mirror   Standing Punch      Elbow flexion/extension 2 10 5#   Wrist flexion/ext/RD Active pronation/supination 1 20 5#   Prone ER 2 10 2# 90/90 TB ER walkaway 2 10 red   Manual Intervention  (56476) X10      Shld /GH Mobs      Post Cap mobs      Thoracic/Rib manipualtion      CT MT/Mobs      PROM MT      Elbow gentle PROM/GISTM proximal wrist flexors 10'     Shoulder PROM      NMR re-education (80712) X18      T-spine Ext      GH depress/compress      Scap/GH NMR      Body blade c red band @ wrist for shld ER nm control 30 2 Medium BB throwing and abduction @ shoulder height, ER at 90* in half kneeling on BOSU   Wall ball roll 2 20 Yellow, flexion/scaption   Ball D2 flexion/extension  red ball   Wall diagonals red   Ball drops  2 20 horiz abd, 90/90, 21oz ball   Floor Snow angels-sliders            Therapeutic Activity (71506) x 10 min      UE throwing porgression      Dynamic UE stability VB chest pass into rebounder, kneeling on BOSU, needs toes on floor per poor core control   Throwing c sport cord and end range perts 1 10    Bodyblade      Supine ecc ball press/ D2 1 20 6#/ sm red        Therapeutic Exercise and NMR EXR  [x] (64619) Provided verbal/tactile cueing for activities related to strengthening, flexibility, endurance, ROM  for improvements in scapular, scapulothoracic and UE control with self care, reaching, carrying, lifting, house/yardwork, driving/computer work.    [] (09854) Provided verbal/tactile cueing for activities related to improving balance, coordination, kinesthetic sense, posture, motor skill, proprioception  to assist with  scapular, scapulothoracic and UE control with self care, reaching, carrying, lifting, house/yardwork, driving/computer work. Therapeutic Activities:    [x] (69664 or 57478) Provided verbal/tactile cueing for activities related to improving balance, coordination, kinesthetic sense, posture, motor skill, proprioception and motor activation to allow for proper function of scapular, scapulothoracic and UE control with self care, carrying, lifting, driving/computer work.      Home Exercise Program:    [x] DASH to assist with reaching prior level of function. 2. Patient will demonstrate increased AROM to [facility] to allow for proper joint functioning as indicated by patients Functional Deficits. 3. Patient will demonstrate an increase in Strength to good scapular and core control, w/in 5lbs HHD for UE to allow for proper functional mobility as indicated by patients Functional Deficits. 4. Patient will return to gripping, throwing, reaching activities without increased symptoms or restriction. Progression Towards Functional goals:  [x] Patient is progressing as expected towards functional goals listed. [] Progression is slowed due to complexities listed. [] Progression has been slowed due to co-morbidities. [] Plan just implemented, too soon to assess goals progression  [] Other:     ASSESSMENT:  Patient continues to make great gains with therapy. Patiient to work more with ATC on scapular stability, elbow flexion/extension strength and return to weight room with the only limitations being fly and push up. Relayed this info to ATC. Return to Play: (if applicable)   []  Stage 1: Intro to Strength   []  Stage 2: Dynamic Strength and Intro to Plyometrics   []  Stage 3: Advanced Plyometrics and Intro to Throwing   []  Stage 4: Sport specific Training/Return to Sport     []  Ready to Return to Play, [employer]   []  Not Ready for Return to Sports   Comments:      Treatment/Activity Tolerance:  [x] Patient tolerated treatment well [] Patient limited by fatique  [] Patient limited by pain  [] Patient limited by other medical complications  [] Other:     Prognosis: [x] Good [] Fair  [] Poor    Patient Requires Follow-up: [x] Yes  [] No    PLAN:  Continue 1X/week until week 14 in protocol as Pt is also working with ATC and has started some lifting.   [x] Continue per plan of care [] Alter current plan (see comments)  [] Plan of care initiated [] Hold pending MD visit [] Discharge    Electronically signed by: Rodgers Saint, ALONSO

## 2019-05-08 ENCOUNTER — HOSPITAL ENCOUNTER (OUTPATIENT)
Dept: PHYSICAL THERAPY | Age: 24
Setting detail: THERAPIES SERIES
Discharge: HOME | End: 2019-05-08
Payer: COMMERCIAL

## 2019-05-08 PROCEDURE — 97110 THERAPEUTIC EXERCISES: CPT

## 2019-05-08 PROCEDURE — 97530 THERAPEUTIC ACTIVITIES: CPT

## 2019-05-08 PROCEDURE — 97112 NEUROMUSCULAR REEDUCATION: CPT

## 2019-05-08 NOTE — FLOWSHEET NOTE
[facility] [address] [place] [address]  Phone: [phone] Fax: [phone]          Date:  2019    Patient Name:  Peter Neumann    :  1995  MRN: [id number]  Restrictions/Precautions:    Medical/Treatment Diagnosis Information:  Diagnosis: s/p right UCL repair     Insurance/Certification information:     Physician Information:  Referring Practitioner: Traci Mendenhall MD  Plan of care signed (Y/N):     Date of Patient follow up with Physician:     G-Code (if applicable):      Date G-Code Applied:         Progress Note: [x]  Yes  []  No  Next due by: Visit #10      Latex Allergy:  [x]NO      []YES  Preferred Language for Healthcare:   [x]English       []other:    Visit # Insurance Allowable   14 120     Pain level:  0-3/10      SUBJECTIVE:  Pt states that he is now 12 weeks post op. Progressing well but notes mild muscle soreness with increasing activity. OBJECTIVE:  Discussed prayer stretch for HEP to work on wrist extension.    MMT: bicep 4+/5, tricep 5/5, wrist ext 4+/5, wrist flex 4+/5   Test measurements:  Ext =0 with forearm supinated, flex=140    RESTRICTIONS/PRECAUTIONS: per Lakeisha Rivera protocol    Exercises/Interventions:   Therapeutic Ex (59619) X20 Sets/sec Reps CUES/Notes   UBE 2/2  Low position and resistance level   Supine D2 Blue    90/90 R/S    4 way Isomet Flex, abd, IR, ext   T- band Row/pinch red   T- band lower pinch red   T- band ER activation red   Supine SA punch c BOSU bridge 2 10 5# 5\"   SL ER 2 10 4#   Prone Rows, horix abd, flex (with elbow flexed) 2 10 5#   Putty , finger pinch, extension each   Seat Table slides/Wall Slides      Table protraction    Wall push ups Easy    Full flexion retro lunge 2 10 red   scap squeezes    Standing flex/scap 2 10 5lb, BOSU lunge @ mirror   Standing Punch      Elbow flexion/extension 2 10 5#   Wrist flexion/ext/RD Active pronation/supination 1 20 5#   Prone ER 2 10 2# 90/90 TB ER walkaway 2 10 red   Manual Intervention  (13097) X10      Shld /GH Mobs      Post Cap mobs      Thoracic/Rib manipualtion      CT MT/Mobs      PROM MT      Elbow gentle PROM/GISTM proximal wrist flexors 10'     Shoulder PROM      NMR re-education (11219) X18      T-spine Ext      GH depress/compress      Scap/GH NMR      Body blade c red band @ wrist for shld ER nm control 30 2 Medium BB throwing and abduction @ shoulder height, ER at 90* in half kneeling on BOSU   Wall ball roll 2 20 Yellow, flexion/scaption 180 flexion    Ball D2 flexion/extension  red ball   Wall diagonals red   Ball drops  2 20 horiz abd, 90/90, 21oz ball   Floor Snow Chang Tomas dribbles  2 20 Flex + er    Therapeutic Activity (16451) x 10 min      UE throwing porgression      Dynamic UE stability VB chest pass into rebounder, kneeling on BOSU, needs toes on floor per poor core control   Throwing c sport cord and end range perts 1 10    Bodyblade      Supine ecc ball press/ D2 1 20 6#/ sm red        Therapeutic Exercise and NMR EXR  [x] (25526) Provided verbal/tactile cueing for activities related to strengthening, flexibility, endurance, ROM  for improvements in scapular, scapulothoracic and UE control with self care, reaching, carrying, lifting, house/yardwork, driving/computer work.    [] (85496) Provided verbal/tactile cueing for activities related to improving balance, coordination, kinesthetic sense, posture, motor skill, proprioception  to assist with  scapular, scapulothoracic and UE control with self care, reaching, carrying, lifting, house/yardwork, driving/computer work.     Therapeutic Activities:    [x] (01631 or 43686) Provided verbal/tactile cueing for activities related to improving balance, coordination, kinesthetic sense, posture, motor skill, proprioception and motor activation to allow for proper function of scapular, scapulothoracic and UE control with self care, carrying, lifting, driving/computer work.     Home Exercise Program:    [x] (14304) Reviewed/Progressed HEP activities related to strengthening, flexibility, endurance, ROM of scapular, scapulothoracic and UE control with self care, reaching, carrying, lifting, house/yardwork, driving/computer work  [] (97280) Reviewed/Progressed HEP activities related to improving balance, coordination, kinesthetic sense, posture, motor skill, proprioception of scapular, scapulothoracic and UE control with self care, reaching, carrying, lifting, house/yardwork, driving/computer work      Manual Treatments:  PROM / STM / Oscillations-Mobs:  G-I, II, III, IV (PA's, Inf., Post.)  [x] (75470) Provided manual therapy to mobilize soft tissue/joints of cervical/CT, scapular GHJ and UE for the purpose of modulating pain, promoting relaxation,  increasing ROM, reducing/eliminating soft tissue swelling/inflammation/restriction, improving soft tissue extensibility and allowing for proper ROM for normal function with self care, reaching, carrying, lifting, house/yardwork, driving/computer work    Modalities:  none    Charges:  Timed Code Treatment Minutes: 52   Total Treatment Minutes: 52     [] EVAL (LOW) 97751 (typically 20 minutes face-to-face)  [] EVAL (MOD) 99098 (typically 30 minutes face-to-face)  [] EVAL (HIGH) 47573 (typically 45 minutes face-to-face)  [] RE-EVAL     [x] CF(20038) x  1   [] IONTO  [x] NMR (82780) x  1   [] VASO  [x] Manual ([id number]) x  1    [] Other:  [x] TA x  1    [] [facility] Traction (73252)  [] ES(attended) (09542)      [] ES (un) (74541):     GOALS:  Patient stated goal: return to throwing    Therapist goals for Patient:   Short Term Goals: To be achieved in: 2 weeks  1. Independent in HEP and progression per patient tolerance, in order to prevent re-injury. 2. Patient will have a decrease in pain to facilitate improvement in movement, function, and ADLs as indicated by Functional Deficits. Long Term Goals: To be achieved in: 4-6 weeks  1. Disability index score of 20% or less for the DASH to assist with reaching prior level of function. 2. Patient will demonstrate increased AROM to [facility] to allow for proper joint functioning as indicated by patients Functional Deficits. 3. Patient will demonstrate an increase in Strength to good scapular and core control, w/in 5lbs HHD for UE to allow for proper functional mobility as indicated by patients Functional Deficits. 4. Patient will return to gripping, throwing, reaching activities without increased symptoms or restriction. Progression Towards Functional goals:  [x] Patient is progressing as expected towards functional goals listed. [] Progression is slowed due to complexities listed. [] Progression has been slowed due to co-morbidities. [] Plan just implemented, too soon to assess goals progression  [] Other:     ASSESSMENT:  Patient continues to make great gains with therapy. Patiient to work more with ATC on scapular stability, elbow flexion/extension strength and return to weight room with the only limitations being fly and push up. Relayed this info to ATC. Return to Play: (if applicable)   []  Stage 1: Intro to Strength   []  Stage 2: Dynamic Strength and Intro to Plyometrics   []  Stage 3: Advanced Plyometrics and Intro to Throwing   []  Stage 4: Sport specific Training/Return to Sport     []  Ready to Return to Play, [employer]   []  Not Ready for Return to Sports   Comments:      Treatment/Activity Tolerance:  [x] Patient tolerated treatment well [] Patient limited by fatique  [] Patient limited by pain  [] Patient limited by other medical complications  [] Other:     Prognosis: [x] Good [] Fair  [] Poor    Patient Requires Follow-up: [x] Yes  [] No    PLAN:  Continue 1X/week until week 14 in protocol as Pt is also working with ATC and has started some lifting.   [x] Continue per plan of care [] Alter current plan (see comments)  [] Plan of care initiated [] Hold pending MD visit [] Discharge    Electronically signed by: Coleen Briseno, PTA

## 2019-05-15 ENCOUNTER — HOSPITAL ENCOUNTER (OUTPATIENT)
Dept: PHYSICAL THERAPY | Age: 24
Setting detail: THERAPIES SERIES
Discharge: HOME | End: 2019-05-15
Payer: COMMERCIAL

## 2019-05-15 PROCEDURE — 97140 MANUAL THERAPY 1/> REGIONS: CPT

## 2019-05-15 PROCEDURE — 97112 NEUROMUSCULAR REEDUCATION: CPT

## 2019-05-15 PROCEDURE — 97110 THERAPEUTIC EXERCISES: CPT

## 2019-05-15 NOTE — FLOWSHEET NOTE
[facility] [address] [place] [address]  Phone: [phone] Fax: [phone]          Date:  5/15/2019    Patient Name:  Nilson Castro    :  1995  MRN: [id number]  Restrictions/Precautions:    Medical/Treatment Diagnosis Information:  Diagnosis: s/p right UCL repair     Insurance/Certification information:     Physician Information:  Referring Practitioner: Evelin Bran MD  Plan of care signed (Y/N):     Date of Patient follow up with Physician:     G-Code (if applicable):      Date G-Code Applied:         Progress Note: [x]  Yes  []  No  Next due by: Visit #10      Latex Allergy:  [x]NO      []YES  Preferred Language for Healthcare:   [x]English       []other:    Visit # Insurance Allowable   15 120     Pain level:  0-3/10      SUBJECTIVE:  Pt reports that his elbow is doing well. No complaints of pain entering PT today. OBJECTIVE:  Discussed prayer stretch for HEP to work on wrist extension.    MMT: bicep 4+/5, tricep 5/5, wrist ext 4+/5, wrist flex 4+/5   Test measurements:  Ext =0 with forearm supinated, flex=140    RESTRICTIONS/PRECAUTIONS: per Vamsi Rivera protocol    Exercises/Interventions:   Therapeutic Ex (53763) X20 Sets/sec Reps CUES/Notes   UBE 2/2  Low position and resistance level   Supine D2 Blue    90/90 R/S    4 way Isomet Flex, abd, IR, ext   T- band Row/pinch red   T- band lower pinch red   T- band ER activation red   Supine SA punch c BOSU bridge 2 10 5# 5\"   SL ER 2 10 4#   Prone Rows, horix abd, flex (with elbow flexed) 2 10 5#   Putty , finger pinch, extension each   Seat Table slides/Wall Slides      Table protraction    Wall push ups Easy    Full flexion retro lunge 2 10 red   scap squeezes    Standing flex/scap 2 10 5lb, BOSU lunge @ mirror   Standing Punch      Elbow flexion/extension 2 10 5#   Wrist flexion/ext/RD Active pronation/supination 1 20 5#   Prone ER 2 10 2#   90/90 TB ER walkaway 2 10 red Manual Intervention  (40004) X10      Shld /GH Mobs      Post Cap mobs      Thoracic/Rib manipualtion      CT MT/Mobs      PROM MT      Elbow gentle PROM/GISTM proximal wrist flexors 10'     Shoulder PROM      NMR re-education (62580) X18      T-spine Ext      GH depress/compress      Scap/GH NMR      Body blade c red band @ wrist for shld ER nm control 30 2 Medium BB throwing and abduction @ shoulder height, ER at 90* in half kneeling on BOSU   Wall ball roll 2 20 Yellow, flexion/scaption 180 flexion    Ball D2 flexion/extension  red ball   Wall diagonals red   Ball drops  2 20 horiz abd, 90/90, 21oz ball   Floor Snow Chang Tomas dribbles  2 20 Flex + er    Therapeutic Activity (04576) x 10 min      UE throwing porgression      Dynamic UE stability VB chest pass into rebounder, kneeling on BOSU, needs toes on floor per poor core control   Throwing c sport cord and end range perts 1 10    Bodyblade      Supine ecc ball press/ D2 1 20 6#/ sm red        Therapeutic Exercise and NMR EXR  [x] (22456) Provided verbal/tactile cueing for activities related to strengthening, flexibility, endurance, ROM  for improvements in scapular, scapulothoracic and UE control with self care, reaching, carrying, lifting, house/yardwork, driving/computer work.    [] (12911) Provided verbal/tactile cueing for activities related to improving balance, coordination, kinesthetic sense, posture, motor skill, proprioception  to assist with  scapular, scapulothoracic and UE control with self care, reaching, carrying, lifting, house/yardwork, driving/computer work. Therapeutic Activities:    [x] (54012 or 60183) Provided verbal/tactile cueing for activities related to improving balance, coordination, kinesthetic sense, posture, motor skill, proprioception and motor activation to allow for proper function of scapular, scapulothoracic and UE control with self care, carrying, lifting, driving/computer work.      Home Exercise Discharge    Electronically signed by: Martine Méndez PTA

## 2019-05-20 ENCOUNTER — HOSPITAL ENCOUNTER (OUTPATIENT)
Dept: PHYSICAL THERAPY | Age: 24
Setting detail: THERAPIES SERIES
Discharge: HOME | End: 2019-05-20
Payer: COMMERCIAL

## 2019-05-20 PROCEDURE — 97112 NEUROMUSCULAR REEDUCATION: CPT

## 2019-05-20 PROCEDURE — 97110 THERAPEUTIC EXERCISES: CPT

## 2019-05-20 PROCEDURE — 97140 MANUAL THERAPY 1/> REGIONS: CPT

## 2019-05-20 NOTE — PLAN OF CARE
Physical Therapy Re-Certification Plan of Care/MD UPDATE      Dear Dr. Amy Veliz  ,    We had the pleasure of treating the following patient for physical therapy services at [address] [place] [address]. A summary of our findings can be found in the updated assessment below. This includes our plan of care. If you have any questions or concerns regarding these findings, please do not hesitate to contact me at the office phone number checked above. Thank you for the referral.     Physician Signature:________________________________Date:__________________  By signing above (or electronic signature), therapists plan is approved by physician    Date Range Of Visits: 19-19  Total Visits to Date: 12  Overall Response to Treatment:   [x]Patient is responding well to treatment and improvement is noted with regards  to goals   []Patient should continue to improve in reasonable time if they continue HEP   []Patient has plateaued and is no longer responding to skilled PT intervention    []Patient is getting worse and would benefit from return to referring MD   []Patient unable to adhere to initial POC   []Other:       Date:  2019    Patient Name:  Kellie Tang    :  1995  MRN: [id number]  Restrictions/Precautions:    Medical/Treatment Diagnosis Information:  Diagnosis: s/p right UCL repair     Insurance/Certification information:     Physician Information:  Referring Practitioner: Flakito Raphael MD  Plan of care signed (Y/N):     Date of Patient follow up with Physician:     G-Code (if applicable):      Date G-Code Applied:         Progress Note: [x]  Yes  []  No  Next due by: Visit #10      Latex Allergy:  [x]NO      []YES  Preferred Language for Healthcare:   [x]English       []other:    Visit # Insurance Allowable   16 120     Pain level:  0-3/10      SUBJECTIVE:  Pt reports right elbow feels great and that he is eager to begin throwing.  He states that he is scheduled to see  of cervical/CT, scapular GHJ and UE for the purpose of modulating pain, promoting relaxation,  increasing ROM, reducing/eliminating soft tissue swelling/inflammation/restriction, improving soft tissue extensibility and allowing for proper ROM for normal function with self care, reaching, carrying, lifting, house/yardwork, driving/computer work    Modalities:  none    Charges:  Timed Code Treatment Minutes: 48   Total Treatment Minutes: 48     [] EVAL (LOW) 57906 (typically 20 minutes face-to-face)  [] EVAL (MOD) 21975 (typically 30 minutes face-to-face)  [] EVAL (HIGH) 10872 (typically 45 minutes face-to-face)  [] RE-EVAL     [x] IQ(02644) x  1   [] IONTO  [x] NMR (22814) x  1   [] VASO  [x] Manual (76572) x  1    [] Other:  [] TA x  1    [] [employer] Traction (08356)  [] ES(attended) (06447)      [] ES (un) (73264):     GOALS:  Patient stated goal: return to throwing NOT MET    Therapist goals for Patient:   Short Term Goals: To be achieved in: 2 weeks  1. Independent in HEP and progression per patient tolerance, in order to prevent re-injury. MET  2. Patient will have a decrease in pain to facilitate improvement in movement, function, and ADLs as indicated by Functional Deficits. MET    Long Term Goals: To be achieved in: 4-6 weeks  1. Disability index score of 20% or less for the DASH to assist with reaching prior level of function. MET (currently 0%)  2. Patient will demonstrate increased AROM to [facility] to allow for proper joint functioning as indicated by patients Functional Deficits. MET  3. Patient will demonstrate an increase in Strength to good scapular and core control, w/in 5lbs HHD for UE to allow for proper functional mobility as indicated by patients Functional Deficits. MET  4. Patient will return to gripping, throwing, reaching activities without increased symptoms or restriction. MET      Progression Towards Functional goals:  [x] Patient is progressing as expected towards functional goals listed.     [] Progression is slowed due to complexities listed. [] Progression has been slowed due to co-morbidities. [] Plan just implemented, too soon to assess goals progression  [] Other:     ASSESSMENT:  Patient has made great gains with therapy to date. He is making good progress toward return to throwing protocol. Return to Play: (if applicable)   []  Stage 1: Intro to Strength   []  Stage 2: Dynamic Strength and Intro to Plyometrics   []  Stage 3: Advanced Plyometrics and Intro to Throwing   []  Stage 4: Sport specific Training/Return to Sport     []  Ready to Return to Play, [employer]   []  Not Ready for Return to Sports   Comments:      Treatment/Activity Tolerance:  [x] Patient tolerated treatment well [] Patient limited by fatique  [] Patient limited by pain  [] Patient limited by other medical complications  [] Other:     Prognosis: [x] Good [] Fair  [] Poor    Patient Requires Follow-up: [x] Yes  [] No    PLAN:  DC from this location, patient to continue at home with [facility].   [] Continue per plan of care [] Alter current plan (see comments)  [] Plan of care initiated [] Hold pending MD visit [x] Discharge    Electronically signed by: Arabella Heart, PT

## 2019-08-25 ENCOUNTER — NURSE ONLY (OUTPATIENT)
Dept: CARDIOLOGY CLINIC | Age: 24
End: 2019-08-25
Payer: COMMERCIAL

## 2019-08-25 DIAGNOSIS — Z02.5 SPORTS PHYSICAL: Primary | ICD-10-CM

## 2019-08-25 PROCEDURE — 93000 ELECTROCARDIOGRAM COMPLETE: CPT | Performed by: INTERNAL MEDICINE

## 2021-03-24 ENCOUNTER — HOSPITAL ENCOUNTER (OUTPATIENT)
Dept: PHYSICAL THERAPY | Age: 26
Setting detail: THERAPIES SERIES
Discharge: HOME | End: 2021-03-24
Payer: COMMERCIAL

## 2021-03-24 PROCEDURE — 97110 THERAPEUTIC EXERCISES: CPT

## 2021-03-24 PROCEDURE — 97161 PT EVAL LOW COMPLEX 20 MIN: CPT

## 2021-03-24 NOTE — FLOWSHEET NOTE
[facility]  [address] [address]  Phone: [phone] Fax: [phone]    Physical Therapy Treatment Note/ Progress Report:     Date:  3/24/2021    Patient Name:  Anu Lawrence    :  1995  MRN: [id number]  Restrictions/Precautions:    Medical/Treatment Diagnosis Information:  · Diagnosis: s/p right Catarino Ros  · Treatment Diagnosis: I34.346  Insurance/Certification information:  PT Insurance Information: [place]  Physician Information:  Referring Practitioner: Jaz Roberson MD  Plan of care signed (Y/N):     Date of Patient follow up with Physician:      Progress Report: []  Yes  []  No     Date Range for reporting period:  Beginning:  3/24/2021  Ending:      Progress report due (10 Rx/or 30 days whichever is less):      Recertification due (POC duration/ or 90 days whichever is less): 2021     Visit # Insurance Allowable Auth Needed   1  []Yes    []No     Pain level:  0/10     SUBJECTIVE:  See eval    OBJECTIVE: See eval   Observation:    Test measurements:      RESTRICTIONS/PRECAUTIONS: follow [place] protocol for Catarino Ros    Exercises/Interventions:   Therapeutic Ex (66224)  Min: 10' Sets/sec Reps CUES/Notes   UBE      Pendulum/Ball rolls      Cane AAROM flex/press      3 way Isomet      T- band Row/pinch      T- band lower pinch      T- band ER activation      Supine SA punch      SL ER/SL punch      Prone Rows/ext      Prone HAB/Prone Flex      Seat Table slides/Wall Slides      Seated HH Depression      No Money      Scap Wall Lat touches/wall walks      Wrist flex/ext 2 10 2#   iso shoulder flex, abd, ER, IR 6\" 10 Each way   Finger web 1 20 green                                 Manual Intervention  (27373)  Min: 5'      Shld /GH Mobs      Post Cap mobs      Thoracic/Rib manipualtion      CT MT/Mobs      PROM MT/wrist PROM  5'     Elbow mobs            NMR re-education (71579)  Min:      T-spine Ext [address] depress/compress      [address] NMR      Body blade      Wall ball roll      Wall Ball bounce      Ball drops      Lauren Scap Bio      Floor Snow angels-sliders            Therapeutic Activity (32588)  Min:      UE throwing porgression      Dynamic UE stability      Earthquake Bar      Bodyblade                Therapeutic Exercise and NMR EXR  [x] (64385) Provided verbal/tactile cueing for activities related to strengthening, flexibility, endurance, ROM  for improvements in scapular, scapulothoracic and UE control with self care, reaching, carrying, lifting, house/yardwork, driving/computer work. [x] (66869) Provided verbal/tactile cueing for activities related to improving balance, coordination, kinesthetic sense, posture, motor skill, proprioception  to assist with  scapular, scapulothoracic and UE control with self care, reaching, carrying, lifting, house/yardwork, driving/computer work. Therapeutic Activities:    [] (56964 or 65848) Provided verbal/tactile cueing for activities related to improving balance, coordination, kinesthetic sense, posture, motor skill, proprioception and motor activation to allow for proper function of scapular, scapulothoracic and UE control with self care, carrying, lifting, driving/computer work.      Home Exercise Program:    [x] (84147) Reviewed/Progressed HEP activities related to strengthening, flexibility, endurance, ROM of scapular, scapulothoracic and UE control with self care, reaching, carrying, lifting, house/yardwork, driving/computer work  [] (80430) Reviewed/Progressed HEP activities related to improving balance, coordination, kinesthetic sense, posture, motor skill, proprioception of scapular, scapulothoracic and UE control with self care, reaching, carrying, lifting, house/yardwork, driving/computer work      Manual Treatments:  PROM / STM / Oscillations-Mobs:  G-I, II, III, IV (PA's, Inf., Post.)  [] (58788) Provided manual therapy to mobilize soft tissue/joints of cervical/CT, scapular GHJ and UE for the purpose of modulating pain, promoting relaxation,  increasing ROM, reducing/eliminating soft tissue swelling/inflammation/restriction, improving soft tissue extensibility and allowing for proper ROM for normal function with self care, reaching, carrying, lifting, house/yardwork, driving/computer work    Modalities:      Charges:  Timed Code Treatment Minutes: 15   Total Treatment Minutes: 40       [x] EVAL (LOW) 14490 (typically 20 minutes face-to-face)  [] EVAL (MOD) 94733 (typically 30 minutes face-to-face)  [] EVAL (HIGH) 93397 (typically 45 minutes face-to-face)  [] RE-EVAL     [x] DG(88149) x  1   [] DRY NEEDLE 1 OR 2 MUSCLES  [] NMR (73078) x     [] DRY NEEDLE 3+ MUSCLES  [] Manual (13424) x       [] TA (45516) x     [] Mech Traction (03601)  [] ES(attended) (70105)     [] ES (un) (31781):   [] VASO (11642)  [] Other:    If [facility] Please Indicate Time In/Out  CPT Code Time in Time out                                   GOALS:  Patient stated goal: return to pain free throwing program  [] Progressing: [] Met: [] Not Met: [] Adjusted    Therapist goals for Patient:   Short Term Goals: To be achieved in: 2 weeks  1. Independent in HEP and progression per patient tolerance, in order to prevent re-injury. [] Progressing: [] Met: [] Not Met: [] Adjusted  2. Patient will have a decrease in pain to facilitate improvement in movement, function, and ADLs as indicated by Functional Deficits. [] Progressing: [] Met: [] Not Met: [] Adjusted    Long Term Goals: To be achieved in: 4-6 weeks  1. Disability index score of 5% or less for the Quick DASH to assist with reaching prior level of function. [] Progressing: [] Met: [] Not Met: [] Adjusted  2. Patient will demonstrate increased AROM to [facility] to allow for proper joint functioning as indicated by Functional Deficits. [] Progressing: [] Met: [] Not Met: [] Adjusted  3.  Patient will demonstrate an increase in NM recruitment/activation and overall GH and scapular strength to within n5lbs HHD or WNL for proper functional mobility as indicated by patients Functional Deficits. [] Progressing: [] Met: [] Not Met: [] Adjusted  4. Patient will return to throwing, weight training activities without increased symptoms or restriction. [] Progressing: [] Met: [] Not Met: [] Adjusted      ASSESSMENT:  See eval    Return to Play: (if applicable)   []  Stage 1: Intro to Strength   []  Stage 2: Dynamic Strength and Intro to Plyometrics   []  Stage 3: Advanced Plyometrics and Intro to Throwing   []  Stage 4: Sport specific Training/Return to Sport     []  Ready to Return to Play, [employer]   []  Not Ready for Return to Sports   Comments:      Treatment/Activity Tolerance:  [x] Patient tolerated treatment well [] Patient limited by fatique  [] Patient limited by pain  [] Patient limited by other medical complications  [] Other:     Overall Progression Towards Functional goals/ Treatment Progress Update:  [] Patient is progressing as expected towards functional goals listed. [] Progression is slowed due to complexities/Impairments listed. [] Progression has been slowed due to co-morbidities. [x] Plan just implemented, too soon to assess goals progression <30days   [] Goals require adjustment due to lack of progress  [] Patient is not progressing as expected and requires additional follow up with physician  [] Other    Prognosis for POC: [x] Good [] Fair  [] Poor    Patient requires continued skilled intervention: [x] Yes  [] No      PLAN: See eval  [] Continue per plan of care [] Alter current plan (see comments)  [x] Plan of care initiated [] Hold pending MD visit [] Discharge    Electronically signed by: Yudith Hayes PT     Note: If patient does not return for scheduled/recommended follow up visits, this note will serve as a discharge from care along with the most recent update on progress.

## 2021-03-24 NOTE — PLAN OF CARE
[facility] [facility]  Phone: [phone]   Fax:     [phone]                                                       Physical Therapy Certification    Dear Referring Practitioner: Shruthi Murguia MD,    We had the pleasure of evaluating the following patient for physical therapy services at [address] [place] [address]. A summary of our findings can be found in the initial assessment below. This includes our plan of care. If you have any questions or concerns regarding these findings, please do not hesitate to contact me at the office phone number checked above. Thank you for the referral.       Physician Signature:_______________________________Date:__________________  By signing above (or electronic signature), therapists plan is approved by physician              Patient: Rick Wright   : 1995   MRN: [id number]  Referring Physician: Referring Practitioner: Shruthi Murguia MD/Charlie Elizalde MD      Evaluation Date: 3/24/2021      Medical Diagnosis Information:  Diagnosis: s/p right Bebo Rivera (UCL reconstruction)   Treatment Diagnosis: M25.521                                         Insurance information: PT Insurance Information: [place]    Precautions/ Contra-indications: right Bebo Rivera X2 (contralateral palmaris graft)  Latex Allergy:   [x]  NO      []YES  Preferred Language for Healthcare:   [x]English       []other:    C-SSRS Triggered by Intake questionnaire (Past 2 wk assessment ):   [x] No, Questionnaire did not trigger screening.   [] Yes, Patient intake triggered C-SSRS Screening      [] C-SSRS Screening completed  [] PCP notified via Epic     SUBJECTIVE: Patient presents to clinic 1 week s/p right UCL reconstruction X2. He states that he is having no pain currently and that he is already doing much better than he did after the first one.  Dr. Nkechi Elizalde used contralateral palmaris consistent with Glenohumeral IR Deficit - <45 degrees   []signs/symptoms consistent with facet dysfunction of cervical/thoracic spine   []signs/symptoms consistent with pathology which may benefit from Dry Needling   []signs/symptoms which may limit the use of advanced manual therapy techniques: (Elevated CV risk profile, recent trauma, intolerance to end range positions, prior TIA, visual issues, UE neurological compromise )     Prognosis/Rehab Potential:      [x]Excellent   []Good    []Fair   []Poor    Tolerance of evaluation/treatment:    [x]Excellent   []Good    []Fair   []Poor    Physical Therapy Evaluation Complexity Justification  [x] A history of present problem with:  [] no personal factors and/or comorbidities that impact the plan of care;  [x]1-2 personal factors and/or comorbidities that impact the plan of care  []3 personal factors and/or comorbidities that impact the plan of care  [x] An examination of body systems using standardized tests and measures addressing any of the following: body structures and functions (impairments), activity limitations, and/or participation restrictions;:  [x] a total of 1-2 or more elements   [] a total of 3 or more elements   [] a total of 4 or more elements   [x] A clinical presentation with:  [x] stable and/or uncomplicated characteristics   [] evolving clinical presentation with changing characteristics  [] unstable and unpredictable characteristics;   [x] Clinical decision making of [x] low, [] moderate, [] high complexity using standardized patient assessment instrument and/or measurable assessment of functional outcome.     [x] EVAL (LOW) 08099 (typically 20 minutes face-to-face)  [] EVAL (MOD) 43137 (typically 30 minutes face-to-face)  [] EVAL (HIGH) 67771 (typically 45 minutes face-to-face)  [] RE-EVAL     PLAN:   Frequency/Duration:  1-2 days per week for 4-6 Weeks:  Interventions:  [x]  Therapeutic exercise including: strength training, ROM, for scapula, core and Upper extremity, including postural re-education. [x]  NMR activation and proprioception for UE, periscapular and RC muscles and Core, including postural re-education. [x]  Manual therapy as indicated for shoulder, scapula, spine and associated soft tissue including: Dry Needling/IASTM, STM, PROM, Gr I-IV mobilizations, manipulation. [x] Modalities as needed that may include: thermal agents, E-stim, Biofeedback, US, iontophoresis as indicated  [x] Patient education on joint protection, postural re-education, activity modification, progression of HEP. HEP instruction: (see scanned forms)    GOALS:  Patient stated goal: return to pain free throwing program  [] Progressing: [] Met: [] Not Met: [] Adjusted    Therapist goals for Patient:   Short Term Goals: To be achieved in: 2 weeks  1. Independent in HEP and progression per patient tolerance, in order to prevent re-injury. [] Progressing: [] Met: [] Not Met: [] Adjusted  2. Patient will have a decrease in pain to facilitate improvement in movement, function, and ADLs as indicated by Functional Deficits. [] Progressing: [] Met: [] Not Met: [] Adjusted    Long Term Goals: To be achieved in: 4-6 weeks  1. Disability index score of 5% or less for the Quick DASH to assist with reaching prior level of function. [] Progressing: [] Met: [] Not Met: [] Adjusted  2. Patient will demonstrate [facility] to allow for proper joint functioning as indicated by Functional Deficits. [] Progressing: [] Met: [] Not Met: [] Adjusted  3. Patient will demonstrate an increase in NM recruitment/activation and overall GH and scapular strength to within n5lbs HHD or WNL for proper functional mobility as indicated by patients Functional Deficits. [] Progressing: [] Met: [] Not Met: [] Adjusted  4. Patient will return to throwing, weight training activities without increased symptoms or restriction.    [] Progressing: [] Met: [] Not Met: [] Adjusted      Electronically signed by:  Tommy Wyatt, PT

## 2021-03-30 ENCOUNTER — HOSPITAL ENCOUNTER (OUTPATIENT)
Dept: PHYSICAL THERAPY | Age: 26
Setting detail: THERAPIES SERIES
Discharge: HOME | End: 2021-03-30
Payer: COMMERCIAL

## 2021-03-30 PROCEDURE — 97110 THERAPEUTIC EXERCISES: CPT

## 2021-03-30 PROCEDURE — 97140 MANUAL THERAPY 1/> REGIONS: CPT

## 2021-03-30 NOTE — FLOWSHEET NOTE
[facility] [address] [place]  Phone: [phone] Fax: [phone]    Physical Therapy Treatment Note/ Progress Report:     Date:  3/30/2021    Patient Name:  Vinicio Mauro    :  1995  MRN: [id number]  Restrictions/Precautions:    Medical/Treatment Diagnosis Information:  · Diagnosis: s/p right Deborah Sieving  · Treatment Diagnosis: J98.792  Insurance/Certification information:  PT Insurance Information: [place]  Physician Information:  Referring Practitioner: Nya Tolbert MD  Plan of care signed (Y/N):     Date of Patient follow up with Physician:      Progress Report: []  Yes  []  No     Date Range for reporting period:  Beginning:  3/24/2021  Ending:      Progress report due (10 Rx/or 30 days whichever is less):      Recertification due (POC duration/ or 90 days whichever is less): 2021     Visit # Insurance Allowable Auth Needed   2  []Yes    []No     Pain level:  0/10     SUBJECTIVE:  Elbow feeling good, occasional tingling into the pinky finger but goes away quickly. Saw surgeon who is clearing brace at 3 weeks.     OBJECTIVE: See eval   Observation:    Test measurements:      RESTRICTIONS/PRECAUTIONS: follow [place] protocol for Deborah Milton    Exercises/Interventions:   Therapeutic Ex (81612)  Min: 20' Sets/sec Reps CUES/Notes   UBE      Pendulum/Ball rolls      Cane AAROM flex/press      3 way Isomet      T- band Row/pinch      T- band lower pinch      T- band ER activation      Supine SA punch      SL ER/SL punch      Prone Rows/ext      Prone HAB/Prone Flex      Seat Table slides/Wall Slides      Seated HH Depression      No Money      Scap Wall Lat touches/wall walks      Wrist flex/ext 2 10 2#   iso shoulder flex, abd, ER, IR 6\" 10 Each way   Finger web 1 20 green   Pron/sup 2 10 2#                           Manual Intervention  (36931)  Min: 15'      Shld /GH Mobs      Post Cap mobs Thoracic/Rib manipualtion      CT MT/Mobs      PROM MT/wrist PROM  15'  Shoulder, elbow, wrist   Elbow mobs            NMR re-education (36281)  Min:      T-spine Ext      GH depress/compress      Scap/GH NMR      Body blade      Wall ball roll      Wall Ball bounce      Ball drops      Lauren Scap Bio      Floor Snow angels-sliders            Therapeutic Activity (38856)  Min:      UE throwing porgression      Dynamic UE stability      Earthquake Bar      Bodyblade                Therapeutic Exercise and NMR EXR  [x] (49428) Provided verbal/tactile cueing for activities related to strengthening, flexibility, endurance, ROM  for improvements in scapular, scapulothoracic and UE control with self care, reaching, carrying, lifting, house/yardwork, driving/computer work. [x] (91159) Provided verbal/tactile cueing for activities related to improving balance, coordination, kinesthetic sense, posture, motor skill, proprioception  to assist with  scapular, scapulothoracic and UE control with self care, reaching, carrying, lifting, house/yardwork, driving/computer work. Therapeutic Activities:    [] (35134 or 31811) Provided verbal/tactile cueing for activities related to improving balance, coordination, kinesthetic sense, posture, motor skill, proprioception and motor activation to allow for proper function of scapular, scapulothoracic and UE control with self care, carrying, lifting, driving/computer work.      Home Exercise Program:    [x] (68756) Reviewed/Progressed HEP activities related to strengthening, flexibility, endurance, ROM of scapular, scapulothoracic and UE control with self care, reaching, carrying, lifting, house/yardwork, driving/computer work  [] (15324) Reviewed/Progressed HEP activities related to improving balance, coordination, kinesthetic sense, posture, motor skill, proprioception of scapular, scapulothoracic and UE control with self care, reaching, carrying, lifting, house/yardwork, driving/computer work      Manual Treatments:  PROM / STM / Oscillations-Mobs:  G-I, II, III, IV (PA's, Inf., Post.)  [] (80475) Provided manual therapy to mobilize soft tissue/joints of cervical/CT, scapular GHJ and UE for the purpose of modulating pain, promoting relaxation,  increasing ROM, reducing/eliminating soft tissue swelling/inflammation/restriction, improving soft tissue extensibility and allowing for proper ROM for normal function with self care, reaching, carrying, lifting, house/yardwork, driving/computer work    Modalities:      Charges:  Timed Code Treatment Minutes: 35   Total Treatment Minutes: 35       [] EVAL (LOW) 89997 (typically 20 minutes face-to-face)  [] EVAL (MOD) 52240 (typically 30 minutes face-to-face)  [] EVAL (HIGH) 15293 (typically 45 minutes face-to-face)  [] RE-EVAL     [x] DC(82010) x  1   [] DRY NEEDLE 1 OR 2 MUSCLES  [] NMR (12627) x     [] DRY NEEDLE 3+ MUSCLES  [x] Manual (61857) x 1      [] TA (60626) x     [] Mech Traction (20254)  [] ES(attended) (74238)     [] ES (un) (14473):   [] VASO (45274)  [] Other:      GOALS:  Patient stated goal: return to pain free throwing program  [] Progressing: [] Met: [] Not Met: [] Adjusted    Therapist goals for Patient:   Short Term Goals: To be achieved in: 2 weeks  1. Independent in HEP and progression per patient tolerance, in order to prevent re-injury. [] Progressing: [] Met: [] Not Met: [] Adjusted  2. Patient will have a decrease in pain to facilitate improvement in movement, function, and ADLs as indicated by Functional Deficits. [] Progressing: [] Met: [] Not Met: [] Adjusted    Long Term Goals: To be achieved in: 4-6 weeks  1. Disability index score of 5% or less for the Quick DASH to assist with reaching prior level of function. [] Progressing: [] Met: [] Not Met: [] Adjusted  2. Patient will demonstrate increased AROM to [facility] to allow for proper joint functioning as indicated by Functional Deficits.    [] Progressing: [] Met: [] Not Met: [] Adjusted  3. Patient will demonstrate an increase in NM recruitment/activation and overall GH and scapular strength to within n5lbs HHD or WNL for proper functional mobility as indicated by patients Functional Deficits. [] Progressing: [] Met: [] Not Met: [] Adjusted  4. Patient will return to throwing, weight training activities without increased symptoms or restriction. [] Progressing: [] Met: [] Not Met: [] Adjusted      ASSESSMENT:  Good tolerance to treatment. Able to tolerate elbow ext to 15 deg from 0 without tightness/pinching. Increased tissue resistance noted at common flexor w/ mild bruising. Cleared to take brace off next week. Return to Play: (if applicable)   [x]  Stage 1: Intro to Strength   []  Stage 2: Dynamic Strength and Intro to Plyometrics   []  Stage 3: Advanced Plyometrics and Intro to Throwing   []  Stage 4: Sport specific Training/Return to Sport     []  Ready to Return to Play, [employer]   []  Not Ready for Return to Sports   Comments:      Treatment/Activity Tolerance:  [x] Patient tolerated treatment well [] Patient limited by fatique  [] Patient limited by pain  [] Patient limited by other medical complications  [] Other:     Overall Progression Towards Functional goals/ Treatment Progress Update:  [] Patient is progressing as expected towards functional goals listed. [] Progression is slowed due to complexities/Impairments listed. [] Progression has been slowed due to co-morbidities.   [x] Plan just implemented, too soon to assess goals progression <30days   [] Goals require adjustment due to lack of progress  [] Patient is not progressing as expected and requires additional follow up with physician  [] Other    Prognosis for POC: [x] Good [] Fair  [] Poor    Patient requires continued skilled intervention: [x] Yes  [] No      PLAN: See eval  [] Continue per plan of care [] Alter current plan (see comments)  [x] Plan of care initiated [] Hold pending MD visit [] Discharge    Electronically signed by: Orlando Miramontes PT     Note: If patient does not return for scheduled/recommended follow up visits, this note will serve as a discharge from care along with the most recent update on progress.

## 2021-04-06 ENCOUNTER — HOSPITAL ENCOUNTER (OUTPATIENT)
Dept: PHYSICAL THERAPY | Age: 26
Setting detail: THERAPIES SERIES
Discharge: HOME | End: 2021-04-06
Payer: COMMERCIAL

## 2021-04-06 PROCEDURE — 97140 MANUAL THERAPY 1/> REGIONS: CPT

## 2021-04-06 PROCEDURE — 97110 THERAPEUTIC EXERCISES: CPT

## 2021-04-06 NOTE — FLOWSHEET NOTE
MT/wrist PROM  15'  Shoulder, elbow, wrist   Elbow mobs      IASTM 5  R bicep, forearm   NMR re-education (57926)  Min:      T-spine Ext      GH depress/compress      Scap/GH NMR      Body blade      Wall ball roll      Wall Ball bounce      Ball drops      Lauren Scap Bio      Floor Snow angels-sliders            Therapeutic Activity (26818)  Min:      UE throwing porgression      Dynamic UE stability      Earthquake Bar      Bodyblade                Therapeutic Exercise and NMR EXR  [x] (99847) Provided verbal/tactile cueing for activities related to strengthening, flexibility, endurance, ROM  for improvements in scapular, scapulothoracic and UE control with self care, reaching, carrying, lifting, house/yardwork, driving/computer work. [x] (25926) Provided verbal/tactile cueing for activities related to improving balance, coordination, kinesthetic sense, posture, motor skill, proprioception  to assist with  scapular, scapulothoracic and UE control with self care, reaching, carrying, lifting, house/yardwork, driving/computer work. Therapeutic Activities:    [] (46931 or 11756) Provided verbal/tactile cueing for activities related to improving balance, coordination, kinesthetic sense, posture, motor skill, proprioception and motor activation to allow for proper function of scapular, scapulothoracic and UE control with self care, carrying, lifting, driving/computer work.      Home Exercise Program:    [x] (18757) Reviewed/Progressed HEP activities related to strengthening, flexibility, endurance, ROM of scapular, scapulothoracic and UE control with self care, reaching, carrying, lifting, house/yardwork, driving/computer work  [] (47346) Reviewed/Progressed HEP activities related to improving balance, coordination, kinesthetic sense, posture, motor skill, proprioception of scapular, scapulothoracic and UE control with self care, reaching, carrying, lifting, house/yardwork, driving/computer work      Manual Treatments:  PROM / STM / Oscillations-Mobs:  G-I, II, III, IV (PA's, Inf., Post.)  [] (78465) Provided manual therapy to mobilize soft tissue/joints of cervical/CT, scapular GHJ and UE for the purpose of modulating pain, promoting relaxation,  increasing ROM, reducing/eliminating soft tissue swelling/inflammation/restriction, improving soft tissue extensibility and allowing for proper ROM for normal function with self care, reaching, carrying, lifting, house/yardwork, driving/computer work    Modalities:      Charges:  Timed Code Treatment Minutes: 43   Total Treatment Minutes: 43       [] EVAL (LOW) 80729 (typically 20 minutes face-to-face)  [] EVAL (MOD) 68641 (typically 30 minutes face-to-face)  [] EVAL (HIGH) 43844 (typically 45 minutes face-to-face)  [] RE-EVAL     [x] JT(48293) x  2   [] DRY NEEDLE 1 OR 2 MUSCLES  [] NMR (69396) x     [] DRY NEEDLE 3+ MUSCLES  [x] Manual (03761) x 1      [] TA (83195) x     [] Mech Traction (06845)  [] ES(attended) (40431)     [] ES (un) (92548):   [] VASO (11687)  [] Other:      GOALS:  Patient stated goal: return to pain free throwing program  [] Progressing: [] Met: [] Not Met: [] Adjusted    Therapist goals for Patient:   Short Term Goals: To be achieved in: 2 weeks  1. Independent in HEP and progression per patient tolerance, in order to prevent re-injury. [] Progressing: [] Met: [] Not Met: [] Adjusted  2. Patient will have a decrease in pain to facilitate improvement in movement, function, and ADLs as indicated by Functional Deficits. [] Progressing: [] Met: [] Not Met: [] Adjusted    Long Term Goals: To be achieved in: 4-6 weeks  1. Disability index score of 5% or less for the Quick DASH to assist with reaching prior level of function. [] Progressing: [] Met: [] Not Met: [] Adjusted  2. Patient will demonstrate increased AROM to [facility] to allow for proper joint functioning as indicated by Functional Deficits. [] Progressing: [] Met: [] Not Met: [] Adjusted  3. Patient will demonstrate an increase in NM recruitment/activation and overall GH and scapular strength to within n5lbs HHD or WNL for proper functional mobility as indicated by patients Functional Deficits. [] Progressing: [] Met: [] Not Met: [] Adjusted  4. Patient will return to throwing, weight training activities without increased symptoms or restriction. [] Progressing: [] Met: [] Not Met: [] Adjusted      ASSESSMENT:  Good tolerance to treatment. Progressed strength as able per protocol, keeping actuve motion pain/tight free. Increased tightness proximal flexor tendon and distal bicep. PROM approx.  without pain/tightness. Brace unlocked 30-90 deg today. Return to Play: (if applicable)   [x]  Stage 1: Intro to Strength   []  Stage 2: Dynamic Strength and Intro to Plyometrics   []  Stage 3: Advanced Plyometrics and Intro to Throwing   []  Stage 4: Sport specific Training/Return to Sport     []  Ready to Return to Play, [employer]   []  Not Ready for Return to Sports   Comments:      Treatment/Activity Tolerance:  [x] Patient tolerated treatment well [] Patient limited by fatique  [] Patient limited by pain  [] Patient limited by other medical complications  [] Other:     Overall Progression Towards Functional goals/ Treatment Progress Update:  [] Patient is progressing as expected towards functional goals listed. [] Progression is slowed due to complexities/Impairments listed. [] Progression has been slowed due to co-morbidities.   [x] Plan just implemented, too soon to assess goals progression <30days   [] Goals require adjustment due to lack of progress  [] Patient is not progressing as expected and requires additional follow up with physician  [] Other    Prognosis for POC: [x] Good [] Fair  [] Poor    Patient requires continued skilled intervention: [x] Yes  [] No      PLAN: See eval  [] Continue per plan of care [] Alter current plan (see comments)  [x] Plan of care initiated [] Hold pending MD visit [] Discharge    Electronically signed by: Juan Sampson PT     Note: If patient does not return for scheduled/recommended follow up visits, this note will serve as a discharge from care along with the most recent update on progress.

## 2021-04-08 ENCOUNTER — HOSPITAL ENCOUNTER (OUTPATIENT)
Dept: PHYSICAL THERAPY | Age: 26
Setting detail: THERAPIES SERIES
Discharge: HOME | End: 2021-04-08
Payer: COMMERCIAL

## 2021-04-08 PROCEDURE — 97110 THERAPEUTIC EXERCISES: CPT

## 2021-04-08 PROCEDURE — 97140 MANUAL THERAPY 1/> REGIONS: CPT

## 2021-04-08 NOTE — FLOWSHEET NOTE
[place] [address] [facility]  Phone: [phone] Fax: [phone]    Physical Therapy Treatment Note/ Progress Report:     Date:  2021    Patient Name:  Kika Yu    :  1995  MRN: [id number]  Restrictions/Precautions:    Medical/Treatment Diagnosis Information:  · Diagnosis: s/p right Smiley Chapman  · Treatment Diagnosis: H63.711  Insurance/Certification information:  PT Insurance Information: [place]  Physician Information:  Referring Practitioner: Harrietta Goldberg, MD  Plan of care signed (Y/N):     Date of Patient follow up with Physician:      Progress Report: []  Yes  []  No     Date Range for reporting period:  Beginning:  3/24/2021  Ending:      Progress report due (10 Rx/or 30 days whichever is less):      Recertification due (POC duration/ or 90 days whichever is less): 2021     Visit # Insurance Allowable Auth Needed   4  []Yes    []No     Pain level:  0/10     SUBJECTIVE:  Elbow feeling good, feels better being able to move it a little more.      OBJECTIVE: See eval   Observation:    Test measurements:      RESTRICTIONS/PRECAUTIONS: follow [place] protocol for Smiley Chapman    Exercises/Interventions:   Therapeutic Ex (01954)  Min: 20' Sets/sec Reps CUES/Notes   UBE      Pendulum/Ball rolls      Cane AAROM flex/press      3 way Isomet      T- band Row/pinch      T- band lower pinch      T- band ER activationq 1 20 Lime; iso steps   Supine SA punch      SL ER/SL punch      Prone Rows/ext 1 20 each   Prone HAB/Prone Flex      Seat Table slides/Wall Slides      Seated HH Depression      No Money      Scap Wall Lat touches/wall walks      Wrist flex/ext 2 10 2#   iso shoulder flex, abd, ER, IR 6\" 10 Each way   Finger web 1 20 green   Pron/sup 2 10 2#   AROM flexion 1 20                      Manual Intervention  (58704)  Min: 15'      Shld /GH Mobs      Post Cap mobs      Thoracic/Rib manipualtion CT MT/Mobs      PROM MT/wrist PROM  15'  Shoulder, elbow, wrist   Elbow mobs      IASTM 5  R bicep, forearm   NMR re-education (12763)  Min:      T-spine Ext      GH depress/compress      Scap/GH NMR      Body blade      Wall ball roll      Wall Ball bounce      Ball drops      Lauren Scap Bio      Floor Snow angels-sliders            Therapeutic Activity (91190)  Min:      UE throwing porgression      Dynamic UE stability      Earthquake Bar      Bodyblade                Therapeutic Exercise and NMR EXR  [x] (44715) Provided verbal/tactile cueing for activities related to strengthening, flexibility, endurance, ROM  for improvements in scapular, scapulothoracic and UE control with self care, reaching, carrying, lifting, house/yardwork, driving/computer work. [x] (40506) Provided verbal/tactile cueing for activities related to improving balance, coordination, kinesthetic sense, posture, motor skill, proprioception  to assist with  scapular, scapulothoracic and UE control with self care, reaching, carrying, lifting, house/yardwork, driving/computer work. Therapeutic Activities:    [] (58033 or 15554) Provided verbal/tactile cueing for activities related to improving balance, coordination, kinesthetic sense, posture, motor skill, proprioception and motor activation to allow for proper function of scapular, scapulothoracic and UE control with self care, carrying, lifting, driving/computer work.      Home Exercise Program:    [x] (59895) Reviewed/Progressed HEP activities related to strengthening, flexibility, endurance, ROM of scapular, scapulothoracic and UE control with self care, reaching, carrying, lifting, house/yardwork, driving/computer work  [] (26821) Reviewed/Progressed HEP activities related to improving balance, coordination, kinesthetic sense, posture, motor skill, proprioception of scapular, scapulothoracic and UE control with self care, reaching, carrying, lifting, house/yardwork, driving/computer work      Manual Treatments:  PROM / STM / Oscillations-Mobs:  G-I, II, III, IV (PA's, Inf., Post.)  [] (56049) Provided manual therapy to mobilize soft tissue/joints of cervical/CT, scapular GHJ and UE for the purpose of modulating pain, promoting relaxation,  increasing ROM, reducing/eliminating soft tissue swelling/inflammation/restriction, improving soft tissue extensibility and allowing for proper ROM for normal function with self care, reaching, carrying, lifting, house/yardwork, driving/computer work    Modalities:      Charges:  Timed Code Treatment Minutes: 43   Total Treatment Minutes: 43       [] EVAL (LOW) 16636 (typically 20 minutes face-to-face)  [] EVAL (MOD) 59333 (typically 30 minutes face-to-face)  [] EVAL (HIGH) 74999 (typically 45 minutes face-to-face)  [] RE-EVAL     [x] TK(13279) x  2   [] DRY NEEDLE 1 OR 2 MUSCLES  [] NMR (62522) x     [] DRY NEEDLE 3+ MUSCLES  [x] Manual (84044) x 1      [] TA (46900) x     [] Mech Traction (81082)  [] ES(attended) (23283)     [] ES (un) (22136):   [] VASO (24857)  [] Other:      GOALS:  Patient stated goal: return to pain free throwing program  [] Progressing: [] Met: [] Not Met: [] Adjusted    Therapist goals for Patient:   Short Term Goals: To be achieved in: 2 weeks  1. Independent in HEP and progression per patient tolerance, in order to prevent re-injury. [] Progressing: [] Met: [] Not Met: [] Adjusted  2. Patient will have a decrease in pain to facilitate improvement in movement, function, and ADLs as indicated by Functional Deficits. [] Progressing: [] Met: [] Not Met: [] Adjusted    Long Term Goals: To be achieved in: 4-6 weeks  1. Disability index score of 5% or less for the Quick DASH to assist with reaching prior level of function. [] Progressing: [] Met: [] Not Met: [] Adjusted  2. Patient will demonstrate increased AROM to [facility] to allow for proper joint functioning as indicated by Functional Deficits.    [] Progressing: [] Met: [] Not Met: [] Adjusted  3. Patient will demonstrate an increase in NM recruitment/activation and overall GH and scapular strength to within n5lbs HHD or WNL for proper functional mobility as indicated by patients Functional Deficits. [] Progressing: [] Met: [] Not Met: [] Adjusted  4. Patient will return to throwing, weight training activities without increased symptoms or restriction. [] Progressing: [] Met: [] Not Met: [] Adjusted      ASSESSMENT:  Good tolerance to treatment. Progressed strength as able per protocol, keeping actuve motion pain/tight free. Increased tightness proximal flexor tendon and distal bicep. PROM approx.  without pain/tightness. Brace unlocked 30-90 deg today. Return to Play: (if applicable)   [x]  Stage 1: Intro to Strength   []  Stage 2: Dynamic Strength and Intro to Plyometrics   []  Stage 3: Advanced Plyometrics and Intro to Throwing   []  Stage 4: Sport specific Training/Return to Sport     []  Ready to Return to Play, [employer]   []  Not Ready for Return to Sports   Comments:      Treatment/Activity Tolerance:  [x] Patient tolerated treatment well [] Patient limited by fatique  [] Patient limited by pain  [] Patient limited by other medical complications  [] Other:     Overall Progression Towards Functional goals/ Treatment Progress Update:  [] Patient is progressing as expected towards functional goals listed. [] Progression is slowed due to complexities/Impairments listed. [] Progression has been slowed due to co-morbidities.   [x] Plan just implemented, too soon to assess goals progression <30days   [] Goals require adjustment due to lack of progress  [] Patient is not progressing as expected and requires additional follow up with physician  [] Other    Prognosis for POC: [x] Good [] Fair  [] Poor    Patient requires continued skilled intervention: [x] Yes  [] No      PLAN: See eval  [] Continue per plan of care [] Alter current plan (see comments)  [x] Plan of care initiated [] Hold pending MD visit [] Discharge    Electronically signed by: Shila Norton PT     Note: If patient does not return for scheduled/recommended follow up visits, this note will serve as a discharge from care along with the most recent update on progress.

## 2021-04-13 ENCOUNTER — HOSPITAL ENCOUNTER (OUTPATIENT)
Dept: PHYSICAL THERAPY | Age: 26
Setting detail: THERAPIES SERIES
Discharge: HOME | End: 2021-04-13
Payer: COMMERCIAL

## 2021-04-13 PROCEDURE — 97110 THERAPEUTIC EXERCISES: CPT

## 2021-04-13 PROCEDURE — 97140 MANUAL THERAPY 1/> REGIONS: CPT

## 2021-04-13 NOTE — FLOWSHEET NOTE
manipualtion      CT MT/Mobs      PROM MT/wrist PROM  15'  Shoulder, elbow, wrist   Elbow mobs      IASTM 5  R bicep, forearm   NMR re-education (62274)  Min:      T-spine Ext      GH depress/compress      Scap/GH NMR      Body blade      Wall ball roll      Wall Ball bounce      Ball drops      Lauren Scap Bio      Floor Snow angels-sliders            Therapeutic Activity (18913)  Min:      UE throwing porgression      Dynamic UE stability      Earthquake Bar      Bodyblade                Therapeutic Exercise and NMR EXR  [x] (92792) Provided verbal/tactile cueing for activities related to strengthening, flexibility, endurance, ROM  for improvements in scapular, scapulothoracic and UE control with self care, reaching, carrying, lifting, house/yardwork, driving/computer work. [x] (27429) Provided verbal/tactile cueing for activities related to improving balance, coordination, kinesthetic sense, posture, motor skill, proprioception  to assist with  scapular, scapulothoracic and UE control with self care, reaching, carrying, lifting, house/yardwork, driving/computer work. Therapeutic Activities:    [] (55224 or 82280) Provided verbal/tactile cueing for activities related to improving balance, coordination, kinesthetic sense, posture, motor skill, proprioception and motor activation to allow for proper function of scapular, scapulothoracic and UE control with self care, carrying, lifting, driving/computer work.      Home Exercise Program:    [x] (36066) Reviewed/Progressed HEP activities related to strengthening, flexibility, endurance, ROM of scapular, scapulothoracic and UE control with self care, reaching, carrying, lifting, house/yardwork, driving/computer work  [] (22322) Reviewed/Progressed HEP activities related to improving balance, coordination, kinesthetic sense, posture, motor skill, proprioception of scapular, scapulothoracic and UE control with self care, reaching, carrying, lifting, house/yardwork, driving/computer work      Manual Treatments:  PROM / STM / Oscillations-Mobs:  G-I, II, III, IV (PA's, Inf., Post.)  [] (41042) Provided manual therapy to mobilize soft tissue/joints of cervical/CT, scapular GHJ and UE for the purpose of modulating pain, promoting relaxation,  increasing ROM, reducing/eliminating soft tissue swelling/inflammation/restriction, improving soft tissue extensibility and allowing for proper ROM for normal function with self care, reaching, carrying, lifting, house/yardwork, driving/computer work    Modalities:      Charges:  Timed Code Treatment Minutes: 43   Total Treatment Minutes: 43       [] EVAL (LOW) 09019 (typically 20 minutes face-to-face)  [] EVAL (MOD) 31977 (typically 30 minutes face-to-face)  [] EVAL (HIGH) 52745 (typically 45 minutes face-to-face)  [] RE-EVAL     [x] TP(39367) x  2   [] DRY NEEDLE 1 OR 2 MUSCLES  [] NMR (04080) x     [] DRY NEEDLE 3+ MUSCLES  [x] Manual (14967) x 1      [] TA (13562) x     [] Mech Traction (32466)  [] ES(attended) (92293)     [] ES (un) (29883):   [] VASO (39433)  [] Other:      GOALS:  Patient stated goal: return to pain free throwing program  [] Progressing: [] Met: [] Not Met: [] Adjusted    Therapist goals for Patient:   Short Term Goals: To be achieved in: 2 weeks  1. Independent in HEP and progression per patient tolerance, in order to prevent re-injury. [] Progressing: [] Met: [] Not Met: [] Adjusted  2. Patient will have a decrease in pain to facilitate improvement in movement, function, and ADLs as indicated by Functional Deficits. [] Progressing: [] Met: [] Not Met: [] Adjusted    Long Term Goals: To be achieved in: 4-6 weeks  1. Disability index score of 5% or less for the Quick DASH to assist with reaching prior level of function. [] Progressing: [] Met: [] Not Met: [] Adjusted  2. Patient will demonstrate increased AROM to [facility] to allow for proper joint functioning as indicated by Functional Deficits.    [] Progressing: [] Met: [] Not Met: [] Adjusted  3. Patient will demonstrate an increase in NM recruitment/activation and overall GH and scapular strength to within n5lbs HHD or WNL for proper functional mobility as indicated by patients Functional Deficits. [] Progressing: [] Met: [] Not Met: [] Adjusted  4. Patient will return to throwing, weight training activities without increased symptoms or restriction. [] Progressing: [] Met: [] Not Met: [] Adjusted      ASSESSMENT:  Good tolerance to treatment. Progressed strength as able per protocol, keeping actuve motion pain/tight free. AROM without tightness  today. Brace opened  per protocol. Return to Play: (if applicable)   [x]  Stage 1: Intro to Strength   []  Stage 2: Dynamic Strength and Intro to Plyometrics   []  Stage 3: Advanced Plyometrics and Intro to Throwing   []  Stage 4: Sport specific Training/Return to Sport     []  Ready to Return to Play, [employer]   []  Not Ready for Return to Sports   Comments:      Treatment/Activity Tolerance:  [x] Patient tolerated treatment well [] Patient limited by fatique  [] Patient limited by pain  [] Patient limited by other medical complications  [] Other:     Overall Progression Towards Functional goals/ Treatment Progress Update:  [] Patient is progressing as expected towards functional goals listed. [] Progression is slowed due to complexities/Impairments listed. [] Progression has been slowed due to co-morbidities.   [x] Plan just implemented, too soon to assess goals progression <30days   [] Goals require adjustment due to lack of progress  [] Patient is not progressing as expected and requires additional follow up with physician  [] Other    Prognosis for POC: [x] Good [] Fair  [] Poor    Patient requires continued skilled intervention: [x] Yes  [] No      PLAN: See eval  [] Continue per plan of care [] Alter current plan (see comments)  [x] Plan of care initiated [] Hold pending MD visit []

## 2021-04-15 ENCOUNTER — HOSPITAL ENCOUNTER (OUTPATIENT)
Dept: PHYSICAL THERAPY | Age: 26
Setting detail: THERAPIES SERIES
Discharge: HOME | End: 2021-04-15
Payer: COMMERCIAL

## 2021-04-15 PROCEDURE — 97110 THERAPEUTIC EXERCISES: CPT

## 2021-04-15 PROCEDURE — 97140 MANUAL THERAPY 1/> REGIONS: CPT

## 2021-04-15 NOTE — FLOWSHEET NOTE
[facility] [address] [place]  Phone: [phone] Fax: [phone]    Physical Therapy Treatment Note/ Progress Report:     Date:  4/15/2021    Patient Name:  Rangel Ramos    :  1995  MRN: [id number]  Restrictions/Precautions:    Medical/Treatment Diagnosis Information:  · Diagnosis: s/p right Marychuy Pina  · Treatment Diagnosis: F61.776  Insurance/Certification information:  PT Insurance Information: [place]  Physician Information:  Referring Practitioner: Calvin Pedraza MD  Plan of care signed (Y/N):     Date of Patient follow up with Physician:      Progress Report: []  Yes  []  No     Date Range for reporting period:  Beginning:  3/24/2021  Ending:      Progress report due (10 Rx/or 30 days whichever is less):      Recertification due (POC duration/ or 90 days whichever is less): 2021     Visit # Insurance Allowable Auth Needed   6  []Yes    []No     Pain level:  0/10     SUBJECTIVE:  Elbow feeling good, no issues w/ increased range of brace on Tuesday.      OBJECTIVE: See eval   Observation:    Test measurements:      RESTRICTIONS/PRECAUTIONS: follow [place] protocol for Marychuy Pina    Exercises/Interventions:   Therapeutic Ex (16046)  Min: 20' Sets/sec Reps CUES/Notes   UBE      Pendulum/Ball rolls      Cane AAROM flex/press      3 way Isomet      T- band Row/pinch      T- band lower pinch      T- band ER activation 1 20 Lime; iso steps   Supine SA punch      SL ER/SL punch      Prone Rows/ext 1 20 each   Prone HAB/Prone Flex 1 20    Seat Table slides/Wall Slides      Seated HH Depression      No Money      Scap Wall Lat touches/wall walks      Wrist flex/ext 2 10 2#   iso shoulder flex, abd, ER, IR 6\" 10 Each way   Finger web 1 20 green   Pron/sup 2 10 2#   AROM flexion 1 20    3 way bicep 1 20 No wt               Manual Intervention  (80353)  Min: 15'      Shld /GH Mobs      Post Cap mobs Thoracic/Rib manipualtion      CT MT/Mobs      PROM MT/wrist PROM  15'  Shoulder, elbow, wrist   Elbow mobs      IASTM 5  R bicep, forearm   NMR re-education (06900)  Min:      T-spine Ext      GH depress/compress      Scap/GH NMR      Body blade      Wall ball roll      Wall Ball bounce      Ball drops      Lauren Scap Bio      Floor Snow angels-sliders            Therapeutic Activity (36004)  Min:      UE throwing porgression      Dynamic UE stability      Earthquake Bar      Bodyblade                Therapeutic Exercise and NMR EXR  [x] (59787) Provided verbal/tactile cueing for activities related to strengthening, flexibility, endurance, ROM  for improvements in scapular, scapulothoracic and UE control with self care, reaching, carrying, lifting, house/yardwork, driving/computer work. [x] (39141) Provided verbal/tactile cueing for activities related to improving balance, coordination, kinesthetic sense, posture, motor skill, proprioception  to assist with  scapular, scapulothoracic and UE control with self care, reaching, carrying, lifting, house/yardwork, driving/computer work. Therapeutic Activities:    [] (78958 or 56124) Provided verbal/tactile cueing for activities related to improving balance, coordination, kinesthetic sense, posture, motor skill, proprioception and motor activation to allow for proper function of scapular, scapulothoracic and UE control with self care, carrying, lifting, driving/computer work.      Home Exercise Program:    [x] (36408) Reviewed/Progressed HEP activities related to strengthening, flexibility, endurance, ROM of scapular, scapulothoracic and UE control with self care, reaching, carrying, lifting, house/yardwork, driving/computer work  [] (32846) Reviewed/Progressed HEP activities related to improving balance, coordination, kinesthetic sense, posture, motor skill, proprioception of scapular, scapulothoracic and UE control with self care, reaching, carrying, lifting, house/yardwork, driving/computer work      Manual Treatments:  PROM / STM / Oscillations-Mobs:  G-I, II, III, IV (PA's, Inf., Post.)  [] (81795) Provided manual therapy to mobilize soft tissue/joints of cervical/CT, scapular GHJ and UE for the purpose of modulating pain, promoting relaxation,  increasing ROM, reducing/eliminating soft tissue swelling/inflammation/restriction, improving soft tissue extensibility and allowing for proper ROM for normal function with self care, reaching, carrying, lifting, house/yardwork, driving/computer work    Modalities:      Charges:  Timed Code Treatment Minutes: 43   Total Treatment Minutes: 43       [] EVAL (LOW) 03286 (typically 20 minutes face-to-face)  [] EVAL (MOD) 55366 (typically 30 minutes face-to-face)  [] EVAL (HIGH) 69504 (typically 45 minutes face-to-face)  [] RE-EVAL     [x] MR(40564) x  2   [] DRY NEEDLE 1 OR 2 MUSCLES  [] NMR (02050) x     [] DRY NEEDLE 3+ MUSCLES  [x] Manual (11407) x 1      [] TA (60531) x     [] Mech Traction (03667)  [] ES(attended) (86731)     [] ES (un) (71152):   [] VASO (39366)  [] Other:      GOALS:  Patient stated goal: return to pain free throwing program  [] Progressing: [] Met: [] Not Met: [] Adjusted    Therapist goals for Patient:   Short Term Goals: To be achieved in: 2 weeks  1. Independent in HEP and progression per patient tolerance, in order to prevent re-injury. [] Progressing: [] Met: [] Not Met: [] Adjusted  2. Patient will have a decrease in pain to facilitate improvement in movement, function, and ADLs as indicated by Functional Deficits. [] Progressing: [] Met: [] Not Met: [] Adjusted    Long Term Goals: To be achieved in: 4-6 weeks  1. Disability index score of 5% or less for the Quick DASH to assist with reaching prior level of function. [] Progressing: [] Met: [] Not Met: [] Adjusted  2. Patient will demonstrate increased AROM to [facility] to allow for proper joint functioning as indicated by Functional Deficits.    [] Progressing: [] Met: [] Not Met: [] Adjusted  3. Patient will demonstrate an increase in NM recruitment/activation and overall GH and scapular strength to within n5lbs HHD or WNL for proper functional mobility as indicated by patients Functional Deficits. [] Progressing: [] Met: [] Not Met: [] Adjusted  4. Patient will return to throwing, weight training activities without increased symptoms or restriction. [] Progressing: [] Met: [] Not Met: [] Adjusted      ASSESSMENT:  Good tolerance to treatment. Progressed strength as able per protocol, keeping actuve motion pain/tight free. AROM without tightness  today. Brace opened  per protocol. Return to Play: (if applicable)   [x]  Stage 1: Intro to Strength   []  Stage 2: Dynamic Strength and Intro to Plyometrics   []  Stage 3: Advanced Plyometrics and Intro to Throwing   []  Stage 4: Sport specific Training/Return to Sport     []  Ready to Return to Play, [employer]   []  Not Ready for Return to Sports   Comments:      Treatment/Activity Tolerance:  [x] Patient tolerated treatment well [] Patient limited by fatique  [] Patient limited by pain  [] Patient limited by other medical complications  [] Other:     Overall Progression Towards Functional goals/ Treatment Progress Update:  [] Patient is progressing as expected towards functional goals listed. [] Progression is slowed due to complexities/Impairments listed. [] Progression has been slowed due to co-morbidities.   [x] Plan just implemented, too soon to assess goals progression <30days   [] Goals require adjustment due to lack of progress  [] Patient is not progressing as expected and requires additional follow up with physician  [] Other    Prognosis for POC: [x] Good [] Fair  [] Poor    Patient requires continued skilled intervention: [x] Yes  [] No      PLAN: See eval  [] Continue per plan of care [] Alter current plan (see comments)  [x] Plan of care initiated [] Hold pending MD visit [] Discharge    Electronically signed by: Arron Opitz, PT     Note: If patient does not return for scheduled/recommended follow up visits, this note will serve as a discharge from care along with the most recent update on progress.

## 2021-04-20 ENCOUNTER — HOSPITAL ENCOUNTER (OUTPATIENT)
Dept: PHYSICAL THERAPY | Age: 26
Setting detail: THERAPIES SERIES
Discharge: HOME | End: 2021-04-20
Payer: COMMERCIAL

## 2021-04-20 PROCEDURE — 97140 MANUAL THERAPY 1/> REGIONS: CPT

## 2021-04-20 PROCEDURE — 97110 THERAPEUTIC EXERCISES: CPT

## 2021-04-20 NOTE — FLOWSHEET NOTE
[place] [address] [facility] [address]  Phone: [phone] Fax: [phone]    Physical Therapy Treatment Note/ Progress Report:     Date:  2021    Patient Name:  Gilberto Gill    :  1995  MRN: [id number]  Restrictions/Precautions:    Medical/Treatment Diagnosis Information:  · Diagnosis: s/p right Juan Manuel Nay  · Treatment Diagnosis: K94.732  Insurance/Certification information:  PT Insurance Information: [place]  Physician Information:  Referring Practitioner: Yesenia Dawson MD  Plan of care signed (Y/N):     Date of Patient follow up with Physician:      Progress Report: []  Yes  []  No     Date Range for reporting period:  Beginning:  3/24/2021  Ending:      Progress report due (10 Rx/or 30 days whichever is less):      Recertification due (POC duration/ or 90 days whichever is less): 2021     Visit # Insurance Allowable Auth Needed   6  []Yes    []No     Pain level:  0/10     SUBJECTIVE:  Elbow feeling good, no issues w/ increased range of brace on Tuesday.      OBJECTIVE: See eval   Observation: PROM elbow ext 0, AROM elbow ext 5 from 0    Test measurements:      RESTRICTIONS/PRECAUTIONS: follow [place] protocol for Juan Manuel Nay    Exercises/Interventions:   Therapeutic Ex (27590)  Min: 30 Sets/sec Reps CUES/Notes   UBE   add                     T- band Row/pinch      T- band lower pinch  TB elbow ext  TB shoulder ext   1  1   20  20   Green  green   T- band ER activation  IR 1  1 20  20   Green; 30 IR to full ER  Green; neutral to full IR   Supine SA punch 20'' 3 Red ball, perts   SL ER/SL punch 1 20 2#   Prone Rows/ext 1 20 each   Prone HAB/Prone Flex 1 20    Seat Table slides/Wall Slides      Seated HH Depression      No Money   add   Scap Wall Lat touches/wall walks      Wrist flex/ext 2#   iso shoulder flex, abd, ER, IR Each way   Finger web green   Pron/sup 2 10 2#   AROM flexion 1 20    3 way bicep 1 20 No wt               Manual Intervention  (11142)  Min: 18'      Shld /GH Mobs      Post Cap mobs      Thoracic/Rib manipualtion      CT MT/Mobs      PROM MT/wrist PROM  5  Shoulder, elbow, wrist   Elbow mobs 8  End range ext/flex   IASTM 5  R bicep, forearm   NMR re-education (92945)  Min:      T-spine Ext      GH depress/compress      Scap/GH NMR      Body blade      Wall ball roll      Wall Ball bounce      Ball drops      Lauren Scap Bio      Floor Snow angels-sliders            Therapeutic Activity (39451)  Min:      UE throwing porgression      Dynamic UE stability      Earthquake Bar      Bodyblade                Therapeutic Exercise and NMR EXR  [x] (90283) Provided verbal/tactile cueing for activities related to strengthening, flexibility, endurance, ROM  for improvements in scapular, scapulothoracic and UE control with self care, reaching, carrying, lifting, house/yardwork, driving/computer work. [x] (39864) Provided verbal/tactile cueing for activities related to improving balance, coordination, kinesthetic sense, posture, motor skill, proprioception  to assist with  scapular, scapulothoracic and UE control with self care, reaching, carrying, lifting, house/yardwork, driving/computer work. Therapeutic Activities:    [] (81780 or 85780) Provided verbal/tactile cueing for activities related to improving balance, coordination, kinesthetic sense, posture, motor skill, proprioception and motor activation to allow for proper function of scapular, scapulothoracic and UE control with self care, carrying, lifting, driving/computer work.      Home Exercise Program:    [x] (12613) Reviewed/Progressed HEP activities related to strengthening, flexibility, endurance, ROM of scapular, scapulothoracic and UE control with self care, reaching, carrying, lifting, house/yardwork, driving/computer work  [] (04696) Reviewed/Progressed HEP activities related to improving balance, coordination, kinesthetic sense, posture, demonstrate increased AROM to [facility] to allow for proper joint functioning as indicated by Functional Deficits. [] Progressing: [] Met: [] Not Met: [] Adjusted  3. Patient will demonstrate an increase in NM recruitment/activation and overall GH and scapular strength to within n5lbs HHD or WNL for proper functional mobility as indicated by patients Functional Deficits. [] Progressing: [] Met: [] Not Met: [] Adjusted  4. Patient will return to throwing, weight training activities without increased symptoms or restriction. [] Progressing: [] Met: [] Not Met: [] Adjusted      ASSESSMENT:  Good tolerance to treatment. Progressed strength as able per protocol, keeping actuve motion pain/tight free. AROM without tightness 5-130 today. Brace opened all the way per protocol w/ pt demonstrating no pain/tightness throughout full range. Return to Play: (if applicable)   [x]  Stage 1: Intro to Strength   []  Stage 2: Dynamic Strength and Intro to Plyometrics   []  Stage 3: Advanced Plyometrics and Intro to Throwing   []  Stage 4: Sport specific Training/Return to Sport     []  Ready to Return to Play, [employer]   []  Not Ready for Return to Sports   Comments:      Treatment/Activity Tolerance:  [x] Patient tolerated treatment well [] Patient limited by fatique  [] Patient limited by pain  [] Patient limited by other medical complications  [] Other:     Overall Progression Towards Functional goals/ Treatment Progress Update:  [] Patient is progressing as expected towards functional goals listed. [] Progression is slowed due to complexities/Impairments listed. [] Progression has been slowed due to co-morbidities.   [x] Plan just implemented, too soon to assess goals progression <30days   [] Goals require adjustment due to lack of progress  [] Patient is not progressing as expected and requires additional follow up with physician  [] Other    Prognosis for POC: [x] Good [] Fair  [] Poor    Patient requires continued skilled intervention: [x] Yes  [] No      PLAN: See eval  [] Continue per plan of care [] Alter current plan (see comments)  [x] Plan of care initiated [] Hold pending MD visit [] Discharge    Electronically signed by: Amber Stone PT     Note: If patient does not return for scheduled/recommended follow up visits, this note will serve as a discharge from care along with the most recent update on progress.

## 2021-04-22 ENCOUNTER — HOSPITAL ENCOUNTER (OUTPATIENT)
Dept: PHYSICAL THERAPY | Age: 26
Setting detail: THERAPIES SERIES
Discharge: HOME | End: 2021-04-22
Payer: COMMERCIAL

## 2021-04-22 PROCEDURE — 97140 MANUAL THERAPY 1/> REGIONS: CPT

## 2021-04-22 PROCEDURE — 97110 THERAPEUTIC EXERCISES: CPT

## 2021-04-22 NOTE — FLOWSHEET NOTE
[facility] [address] [address] [place] [address]  Phone: [phone] Fax: [phone]    Physical Therapy Treatment Note/ Progress Report:     Date:  2021    Patient Name:  Lowell Serrano    :  1995  MRN: [id number]  Restrictions/Precautions:    Medical/Treatment Diagnosis Information:  · Diagnosis: s/p right Garcia Daquan  · Treatment Diagnosis: Z51.342  Insurance/Certification information:  PT Insurance Information: [place]  Physician Information:  Referring Practitioner: Cordelia Barthel, MD  Plan of care signed (Y/N):     Date of Patient follow up with Physician:      Progress Report: []  Yes  []  No     Date Range for reporting period:  Beginning:  3/24/2021  Ending:      Progress report due (10 Rx/or 30 days whichever is less):      Recertification due (POC duration/ or 90 days whichever is less): 2021     Visit # Insurance Allowable Auth Needed   7  []Yes    []No     Pain level:  0/10     SUBJECTIVE:  Elbow feeling good, no issues w/ increased range of brace on Tuesday.      OBJECTIVE: See eval   Observation: PROM elbow ext 0, AROM elbow ext 5 from 0    Test measurements:      RESTRICTIONS/PRECAUTIONS: follow [place] protocol for Garcia Daquan    Exercises/Interventions:   Therapeutic Ex (12771)  Min: 30 Sets/sec Reps CUES/Notes   UBE   add                     T- band Row/pinch      T- band lower pinch  TB elbow ext  TB shoulder ext   1  1   20  20   Green  green   T- band ER activation  IR 1  1 20  20   Green; 30 IR to full ER  Green; neutral to full IR   Supine SA punch 20'' 3 Red ball, perts   SL ER/SL punch 1 20 2#   Prone Rows/ext 1 20 each   Prone HAB/Prone Flex 1 20    Seat Table slides/Wall Slides      Seated HH Depression      No Money   add   Scap Wall Lat touches/wall walks      Wrist flex/ext 2#   iso shoulder flex, abd, ER, IR Each way   Finger web green   Pron/sup 2 10 2#   AROM flexion 1 20    3 way bicep 1 20 No wt               Manual Intervention  (17600)  Min: 18'      Shld /GH Mobs      Post Cap mobs      Thoracic/Rib manipualtion      CT MT/Mobs      PROM MT/wrist PROM  5  Shoulder, elbow, wrist   Elbow mobs 8  End range ext/flex   IASTM 5  R bicep, forearm   NMR re-education (87391)  Min:      T-spine Ext      GH depress/compress      Scap/GH NMR      Body blade      Wall ball roll      Wall Ball bounce      Ball drops      Lauren Scap Bio      Floor Snow angels-sliders            Therapeutic Activity (91915)  Min:      UE throwing porgression      Dynamic UE stability      Earthquake Bar      Bodyblade                Therapeutic Exercise and NMR EXR  [x] (21233) Provided verbal/tactile cueing for activities related to strengthening, flexibility, endurance, ROM  for improvements in scapular, scapulothoracic and UE control with self care, reaching, carrying, lifting, house/yardwork, driving/computer work. [x] (20897) Provided verbal/tactile cueing for activities related to improving balance, coordination, kinesthetic sense, posture, motor skill, proprioception  to assist with  scapular, scapulothoracic and UE control with self care, reaching, carrying, lifting, house/yardwork, driving/computer work. Therapeutic Activities:    [] (47962 or 56492) Provided verbal/tactile cueing for activities related to improving balance, coordination, kinesthetic sense, posture, motor skill, proprioception and motor activation to allow for proper function of scapular, scapulothoracic and UE control with self care, carrying, lifting, driving/computer work.      Home Exercise Program:    [x] (92611) Reviewed/Progressed HEP activities related to strengthening, flexibility, endurance, ROM of scapular, scapulothoracic and UE control with self care, reaching, carrying, lifting, house/yardwork, driving/computer work  [] (37997) Reviewed/Progressed HEP activities related to improving balance, coordination, kinesthetic sense, posture, motor skill, proprioception of scapular, scapulothoracic and UE control with self care, reaching, carrying, lifting, house/yardwork, driving/computer work      Manual Treatments:  PROM / STM / Oscillations-Mobs:  G-I, II, III, IV (Bianca, Inf., Post.)  [] (79904) Provided manual therapy to mobilize soft tissue/joints of cervical/CT, scapular GHJ and UE for the purpose of modulating pain, promoting relaxation,  increasing ROM, reducing/eliminating soft tissue swelling/inflammation/restriction, improving soft tissue extensibility and allowing for proper ROM for normal function with self care, reaching, carrying, lifting, house/yardwork, driving/computer work    Modalities:      Charges:  Timed Code Treatment Minutes: 50   Total Treatment Minutes: 50       [] EVAL (LOW) 88875 (typically 20 minutes face-to-face)  [] EVAL (MOD) 13879 (typically 30 minutes face-to-face)  [] EVAL (HIGH) 17496 (typically 45 minutes face-to-face)  [] RE-EVAL     [x] YOMAIRA(41466) x  2   [] DRY NEEDLE 1 OR 2 MUSCLES  [] NMR (51510) x     [] DRY NEEDLE 3+ MUSCLES  [x] Manual (70656) x 1      [] TA (81399) x     [] Mech Traction (75381)  [] ES(attended) (51628)     [] ES (un) (60580):   [] VASO (96931)  [] Other:      GOALS:  Patient stated goal: return to pain free throwing program  [] Progressing: [] Met: [] Not Met: [] Adjusted    Therapist goals for Patient:   Short Term Goals: To be achieved in: 2 weeks  1. Independent in HEP and progression per patient tolerance, in order to prevent re-injury. [] Progressing: [] Met: [] Not Met: [] Adjusted  2. Patient will have a decrease in pain to facilitate improvement in movement, function, and ADLs as indicated by Functional Deficits. [] Progressing: [] Met: [] Not Met: [] Adjusted    Long Term Goals: To be achieved in: 4-6 weeks  1. Disability index score of 5% or less for the Quick DASH to assist with reaching prior level of function. [] Progressing: [] Met: [] Not Met: [] Adjusted  2.  Patient will demonstrate increased AROM to [facility] to allow for proper joint functioning as indicated by Functional Deficits. [] Progressing: [] Met: [] Not Met: [] Adjusted  3. Patient will demonstrate an increase in NM recruitment/activation and overall GH and scapular strength to within n5lbs HHD or WNL for proper functional mobility as indicated by patients Functional Deficits. [] Progressing: [] Met: [] Not Met: [] Adjusted  4. Patient will return to throwing, weight training activities without increased symptoms or restriction. [] Progressing: [] Met: [] Not Met: [] Adjusted      ASSESSMENT:  Good tolerance to treatment. Progressed strength as able per protocol, keeping actuve motion pain/tight free. AROM without tightness 2-130 today. Brace opened all the way per protocol w/ pt demonstrating no pain/tightness throughout full range. Return to Play: (if applicable)   [x]  Stage 1: Intro to Strength   []  Stage 2: Dynamic Strength and Intro to Plyometrics   []  Stage 3: Advanced Plyometrics and Intro to Throwing   []  Stage 4: Sport specific Training/Return to Sport     []  Ready to Return to Play, [employer]   []  Not Ready for Return to Sports   Comments:      Treatment/Activity Tolerance:  [x] Patient tolerated treatment well [] Patient limited by fatique  [] Patient limited by pain  [] Patient limited by other medical complications  [] Other:     Overall Progression Towards Functional goals/ Treatment Progress Update:  [] Patient is progressing as expected towards functional goals listed. [] Progression is slowed due to complexities/Impairments listed. [] Progression has been slowed due to co-morbidities.   [x] Plan just implemented, too soon to assess goals progression <30days   [] Goals require adjustment due to lack of progress  [] Patient is not progressing as expected and requires additional follow up with physician  [] Other    Prognosis for POC: [x] Good [] Fair  [] Poor    Patient requires continued skilled intervention: [x] Yes  [] No      PLAN: See eval  [] Continue per plan of care [] Alter current plan (see comments)  [x] Plan of care initiated [] Hold pending MD visit [] Discharge    Electronically signed by: Noreen Kong PT     Note: If patient does not return for scheduled/recommended follow up visits, this note will serve as a discharge from care along with the most recent update on progress.

## 2021-04-27 ENCOUNTER — HOSPITAL ENCOUNTER (OUTPATIENT)
Dept: PHYSICAL THERAPY | Age: 26
Setting detail: THERAPIES SERIES
Discharge: HOME | End: 2021-04-27
Payer: COMMERCIAL

## 2021-04-27 PROCEDURE — 97110 THERAPEUTIC EXERCISES: CPT

## 2021-04-27 PROCEDURE — 97140 MANUAL THERAPY 1/> REGIONS: CPT

## 2021-04-27 NOTE — FLOWSHEET NOTE
[facility] [address] [place]  Phone: [phone] Fax: [phone]    Physical Therapy Treatment Note/ Progress Report:     Date:  2021    Patient Name:  Alisha Campa    :  1995  MRN: [id number]  Restrictions/Precautions:    Medical/Treatment Diagnosis Information:  · Diagnosis: s/p right Jose Luis Saha  · Treatment Diagnosis: J22.799  Insurance/Certification information:  PT Insurance Information: [place]  Physician Information:  Referring Practitioner: Harish Melgar MD  Plan of care signed (Y/N):     Date of Patient follow up with Physician:      Progress Report: [x]  Yes  []  No     Date Range for reporting period:  Beginning:  3/24/2021  Ending:      Progress report due (10 Rx/or 30 days whichever is less):      Recertification due (POC duration/ or 90 days whichever is less): 2021     Visit # Insurance Allowable Auth Needed   8  []Yes    []No     Pain level:  0/10     SUBJECTIVE: 6 weeks today. Elbow feeling good, no issues with open brace. Has 6 week follow up tomorrow.      OBJECTIVE: See eval   Observation: PROM elbow ext 0, AROM elbow ext 5 from 0    Test measurements:      2021  ROM Left Right   Shoulder Flex WFL Slightly limited   Shoulder Abd WFL Tight at end range   [facility]   Shoulder IR [facility] WFL   Elbow flex    Elbow ext [facility] 4          RESTRICTIONS/PRECAUTIONS: follow [place] protocol for Jose Luis Saha    Exercises/Interventions:   Therapeutic Ex (98722)  Min: 30 Sets/sec Reps CUES/Notes   UBE   add                     T- band Row/pinch      T- band lower pinch  TB elbow ext  TB shoulder ext   1  1   20  20   Green  green   T- band ER activation  IR 1  1 20  20   Green  Green   Supine SA punch 20'' 3 Red ball, perts   SL ER/SL punch 1 20 2#   Prone Rows/ext 1 20 3#   Prone HAB/Prone Flex 1 20 2#   Seat Table slides/Wall Slides      Seated HH Depression      No Money add   Scap Wall Lat touches/wall walks   add   Wrist flex/ext 2#   iso shoulder flex, abd, ER, IR Each way   Finger web green   Pron/sup 2 10 2#   AROM flexion 1 20    3 way bicep 1 20 No wt               Manual Intervention  (87766)  Min: 18'      Shld /GH Mobs      Post Cap mobs      Thoracic/Rib manipualtion      CT MT/Mobs      PROM MT/wrist PROM  5  Shoulder, elbow, wrist   Elbow mobs 8  End range ext/flex   IASTM 5  R bicep, forearm   NMR re-education (49332)  Min:      T-spine Ext      GH depress/compress      Scap/GH NMR      Body blade      Wall ball roll      Wall Ball bounce      Ball drops      Lauren Scap Bio      Floor Snow angels-sliders            Therapeutic Activity (27678)  Min:      UE throwing porgression      Dynamic UE stability      Earthquake Bar      Bodyblade                Therapeutic Exercise and NMR EXR  [x] (26156) Provided verbal/tactile cueing for activities related to strengthening, flexibility, endurance, ROM  for improvements in scapular, scapulothoracic and UE control with self care, reaching, carrying, lifting, house/yardwork, driving/computer work. [x] (11646) Provided verbal/tactile cueing for activities related to improving balance, coordination, kinesthetic sense, posture, motor skill, proprioception  to assist with  scapular, scapulothoracic and UE control with self care, reaching, carrying, lifting, house/yardwork, driving/computer work. Therapeutic Activities:    [] (29946 or 86754) Provided verbal/tactile cueing for activities related to improving balance, coordination, kinesthetic sense, posture, motor skill, proprioception and motor activation to allow for proper function of scapular, scapulothoracic and UE control with self care, carrying, lifting, driving/computer work.      Home Exercise Program:    [x] (97275) Reviewed/Progressed HEP activities related to strengthening, flexibility, endurance, ROM of scapular, scapulothoracic and UE control with self care, 4-6 weeks  1. Disability index score of 5% or less for the Quick DASH to assist with reaching prior level of function. [] Progressing: [] Met: [] Not Met: [] Adjusted  2. Patient will demonstrate increased AROM to [facility] to allow for proper joint functioning as indicated by Functional Deficits. [] Progressing: [] Met: [] Not Met: [] Adjusted  3. Patient will demonstrate an increase in NM recruitment/activation and overall GH and scapular strength to within n5lbs HHD or WNL for proper functional mobility as indicated by patients Functional Deficits. [] Progressing: [] Met: [] Not Met: [] Adjusted  4. Patient will return to throwing, weight training activities without increased symptoms or restriction. [] Progressing: [] Met: [] Not Met: [] Adjusted      ASSESSMENT:  Good tolerance to treatment. Brace discharged today w/ full PROM, increased tightness into end range elbow flexion w/ tightness in the back of the elbow. No tightness or pain into end range extension w/ OP. Progressing periscapular and forearm strength within tolerance and form. Return to Play: (if applicable)   [x]  Stage 1: Intro to Strength   []  Stage 2: Dynamic Strength and Intro to Plyometrics   []  Stage 3: Advanced Plyometrics and Intro to Throwing   []  Stage 4: Sport specific Training/Return to Sport     []  Ready to Return to Play, [employer]   []  Not Ready for Return to Sports   Comments:      Treatment/Activity Tolerance:  [x] Patient tolerated treatment well [] Patient limited by fatique  [] Patient limited by pain  [] Patient limited by other medical complications  [] Other:     Overall Progression Towards Functional goals/ Treatment Progress Update:  [] Patient is progressing as expected towards functional goals listed. [] Progression is slowed due to complexities/Impairments listed. [] Progression has been slowed due to co-morbidities.   [x] Plan just implemented, too soon to assess goals progression <30days   []

## 2021-04-29 ENCOUNTER — HOSPITAL ENCOUNTER (OUTPATIENT)
Dept: PHYSICAL THERAPY | Age: 26
Setting detail: THERAPIES SERIES
Discharge: HOME | End: 2021-04-29
Payer: COMMERCIAL

## 2021-04-29 PROCEDURE — 97140 MANUAL THERAPY 1/> REGIONS: CPT

## 2021-04-29 PROCEDURE — 97110 THERAPEUTIC EXERCISES: CPT

## 2021-04-29 NOTE — FLOWSHEET NOTE
Wall Lat touches/wall walks   add           Finger web green   Pron/sup water stick w/ shoulder flex 2 10    AROM flexion 1 20    3 way bicep 2 10 5#               Manual Intervention  (22340)  Min: 18'      Shld /GH Mobs      Post Cap mobs      Thoracic/Rib manipualtion      CT MT/Mobs      PROM MT/wrist PROM  5  Shoulder, elbow, wrist   Elbow mobs 8  End range ext/flex   IASTM 5  R bicep, forearm   NMR re-education (51123)  Min:      T-spine Ext      GH depress/compress      Scap/GH NMR      Body blade      Wall ball roll 1 20 Yellow sand ball    Wall Ball bounce      Ball drops      Lauren Scap Bio      Floor Snow angels-sliders            Therapeutic Activity (78455)  Min:      UE throwing porgression      Dynamic UE stability      Earthquake Bar      Bodyblade                Therapeutic Exercise and NMR EXR  [x] (46133) Provided verbal/tactile cueing for activities related to strengthening, flexibility, endurance, ROM  for improvements in scapular, scapulothoracic and UE control with self care, reaching, carrying, lifting, house/yardwork, driving/computer work. [x] (54722) Provided verbal/tactile cueing for activities related to improving balance, coordination, kinesthetic sense, posture, motor skill, proprioception  to assist with  scapular, scapulothoracic and UE control with self care, reaching, carrying, lifting, house/yardwork, driving/computer work. Therapeutic Activities:    [] (72730 or 52507) Provided verbal/tactile cueing for activities related to improving balance, coordination, kinesthetic sense, posture, motor skill, proprioception and motor activation to allow for proper function of scapular, scapulothoracic and UE control with self care, carrying, lifting, driving/computer work.      Home Exercise Program:    [x] (66487) Reviewed/Progressed HEP activities related to strengthening, flexibility, endurance, ROM of scapular, scapulothoracic and UE control with self care, reaching, carrying, lifting, house/yardwork, driving/computer work  [] (21580) Reviewed/Progressed HEP activities related to improving balance, coordination, kinesthetic sense, posture, motor skill, proprioception of scapular, scapulothoracic and UE control with self care, reaching, carrying, lifting, house/yardwork, driving/computer work      Manual Treatments:  PROM / STM / Oscillations-Mobs:  G-I, II, III, IV (PA's, Inf., Post.)  [] ([id number]) Provided manual therapy to mobilize soft tissue/joints of cervical/CT, scapular GHJ and UE for the purpose of modulating pain, promoting relaxation,  increasing ROM, reducing/eliminating soft tissue swelling/inflammation/restriction, improving soft tissue extensibility and allowing for proper ROM for normal function with self care, reaching, carrying, lifting, house/yardwork, driving/computer work    Modalities:      Charges:  Timed Code Treatment Minutes: 50   Total Treatment Minutes: 50       [] EVAL (LOW) 39213 (typically 20 minutes face-to-face)  [] EVAL (MOD) 90921 (typically 30 minutes face-to-face)  [] EVAL (HIGH) 423 8935 (typically 45 minutes face-to-face)  [] RE-EVAL     [x] XZ(54619) x  2   [] DRY NEEDLE 1 OR 2 MUSCLES  [] NMR (01435) x     [] DRY NEEDLE 3+ MUSCLES  [x] Manual (53322) x 1      [] TA (37011) x     [] Mech Traction (21748)  [] ES(attended) (29123)     [] ES (un) (22701):   [] VASO (58611)  [] Other:      GOALS:  Patient stated goal: return to pain free throwing program  [] Progressing: [] Met: [] Not Met: [] Adjusted    Therapist goals for Patient:   Short Term Goals: To be achieved in: 2 weeks  1. Independent in HEP and progression per patient tolerance, in order to prevent re-injury. [] Progressing: [] Met: [] Not Met: [] Adjusted  2. Patient will have a decrease in pain to facilitate improvement in movement, function, and ADLs as indicated by Functional Deficits. [] Progressing: [] Met: [] Not Met: [] Adjusted    Long Term Goals: To be achieved in: 4-6 weeks  1. Disability index score of 5% or less for the Quick DASH to assist with reaching prior level of function. [] Progressing: [] Met: [] Not Met: [] Adjusted  2. Patient will demonstrate increased AROM to [facility] to allow for proper joint functioning as indicated by Functional Deficits. [] Progressing: [] Met: [] Not Met: [] Adjusted  3. Patient will demonstrate an increase in NM recruitment/activation and overall GH and scapular strength to within n5lbs HHD or WNL for proper functional mobility as indicated by patients Functional Deficits. [] Progressing: [] Met: [] Not Met: [] Adjusted  4. Patient will return to throwing, weight training activities without increased symptoms or restriction. [] Progressing: [] Met: [] Not Met: [] Adjusted      ASSESSMENT:  Good tolerance to treatment. Progressing baseline strength as tolerated and form allows. Quick fatigue w/ active end range elbow extension, no symptoms. Cues needed for scap position w/ RTC strength. Demonstrating full active elbow ROM without symptoms. Return to Play: (if applicable)   [x]  Stage 1: Intro to Strength   []  Stage 2: Dynamic Strength and Intro to Plyometrics   []  Stage 3: Advanced Plyometrics and Intro to Throwing   []  Stage 4: Sport specific Training/Return to Sport     []  Ready to Return to Play, [employer]   []  Not Ready for Return to Sports   Comments:      Treatment/Activity Tolerance:  [x] Patient tolerated treatment well [] Patient limited by fatique  [] Patient limited by pain  [] Patient limited by other medical complications  [] Other:     Overall Progression Towards Functional goals/ Treatment Progress Update:  [] Patient is progressing as expected towards functional goals listed. [] Progression is slowed due to complexities/Impairments listed. [] Progression has been slowed due to co-morbidities.   [x] Plan just implemented, too soon to assess goals progression <30days   [] Goals require adjustment due to lack of

## 2021-05-04 ENCOUNTER — HOSPITAL ENCOUNTER (OUTPATIENT)
Dept: PHYSICAL THERAPY | Age: 26
Setting detail: THERAPIES SERIES
Discharge: HOME | End: 2021-05-04
Payer: COMMERCIAL

## 2021-05-04 PROCEDURE — 97110 THERAPEUTIC EXERCISES: CPT

## 2021-05-04 PROCEDURE — 97140 MANUAL THERAPY 1/> REGIONS: CPT

## 2021-05-04 PROCEDURE — 97112 NEUROMUSCULAR REEDUCATION: CPT

## 2021-05-04 NOTE — FLOWSHEET NOTE
[facility] [address] [facility] [address] [facility] [address]  Phone: [phone] Fax: [phone]      Physical Therapy Treatment Note/ Progress Report:     Date:  2021    Patient Name:  Jazmyn Gallego    :  1995  MRN: [id number]  Restrictions/Precautions:    Medical/Treatment Diagnosis Information:  · Diagnosis: s/p right Steve Light  · Treatment Diagnosis: Y88.060  Insurance/Certification information:  PT Insurance Information: [place]  Physician Information:  Referring Practitioner: Paul Guevara MD  Plan of care signed (Y/N):     Date of Patient follow up with Physician:      Progress Report: []  Yes  [x]  No     Date Range for reporting period:  Beginning:  3/24/2021  Ending:      Progress report due (10 Rx/or 30 days whichever is less):      Recertification due (POC duration/ or 90 days whichever is less): 2021     Visit # Insurance Allowable Auth Needed   9  []Yes    []No     Pain level:  0/10     SUBJECTIVE: 7 weeks today. Elbow feeling good. No issues with progression of weight last week.      OBJECTIVE: See eval   Observation:   Test measurements:      2021  ROM Left Right   Shoulder Flex WFL Slightly limited   Shoulder Abd WFL Tight at end range   Shoulder ER [facility]   Shoulder IR [facility] WFL   Elbow flex    Elbow ext WFL 1         RESTRICTIONS/PRECAUTIONS: follow [place] protocol for Steve Light    Exercises/Interventions:   Therapeutic Ex (03317)  Min: 30 Sets/sec Reps CUES/Notes   UBE 4                       T- band Row/pinch      T- band lower pinch  TB elbow ext  TB shoulder ext   1  1   20  20   Green  green   T- band ER activation  IR  D2 flex w/ ball punch 1  1  1 20  20  20 Green  Green   Supine SA punch 20'' 3 Red ball, perts   SL ER/SL punch 1 20 4#   Prone Rows/ext 1 20 6#   Prone HAB/Prone Flex 1 20 4#   Seat Table slides/Wall Slides      Seated HH Depression      No Money 5 15 [place]    Scap Wall Lat touches/wall walks 2 5 [place]; bosu           Finger web green   Pron/sup water stick w/ shoulder flex 2 10    AROM flexion 1 20    3 way bicep 2 10 6#               Manual Intervention  (86174)  Min: 18'      Shld /GH Mobs      Post Cap mobs      Thoracic/Rib manipualtion      CT MT/Mobs      PROM MT/wrist PROM  5  Shoulder, elbow, wrist   Elbow mobs 8  End range ext/flex   IASTM 5  R bicep, forearm   NMR re-education (05280)  Min: 10      T-spine Ext      GH depress/compress      Scap/GH NMR      Body blade      Wall ball roll 1 20 Yellow sand ball    Wall Ball bounce 2 20 ER green   Ball drops 2 20 HAB/Y   Lauren Scap Bio      Floor Snow angels-sliders            Therapeutic Activity (28076)  Min:      UE throwing porgression      Dynamic UE stability      Earthquake Bar      Bodyblade                Therapeutic Exercise and NMR EXR  [x] (29894) Provided verbal/tactile cueing for activities related to strengthening, flexibility, endurance, ROM  for improvements in scapular, scapulothoracic and UE control with self care, reaching, carrying, lifting, house/yardwork, driving/computer work. [x] (95148) Provided verbal/tactile cueing for activities related to improving balance, coordination, kinesthetic sense, posture, motor skill, proprioception  to assist with  scapular, scapulothoracic and UE control with self care, reaching, carrying, lifting, house/yardwork, driving/computer work. Therapeutic Activities:    [] (70186 or 14932) Provided verbal/tactile cueing for activities related to improving balance, coordination, kinesthetic sense, posture, motor skill, proprioception and motor activation to allow for proper function of scapular, scapulothoracic and UE control with self care, carrying, lifting, driving/computer work.      Home Exercise Program:    [x] (13114) Reviewed/Progressed HEP activities related to strengthening, flexibility, endurance, ROM of scapular, scapulothoracic and UE control with self care, reaching, carrying, lifting, house/yardwork, driving/computer work  [] (05960) Reviewed/Progressed HEP activities related to improving balance, coordination, kinesthetic sense, posture, motor skill, proprioception of scapular, scapulothoracic and UE control with self care, reaching, carrying, lifting, house/yardwork, driving/computer work      Manual Treatments:  PROM / STM / Oscillations-Mobs:  G-I, II, III, IV (PA's, Inf., Post.)  [] ([id number]) Provided manual therapy to mobilize soft tissue/joints of cervical/CT, scapular GHJ and UE for the purpose of modulating pain, promoting relaxation,  increasing ROM, reducing/eliminating soft tissue swelling/inflammation/restriction, improving soft tissue extensibility and allowing for proper ROM for normal function with self care, reaching, carrying, lifting, house/yardwork, driving/computer work    Modalities:      Charges:  Timed Code Treatment M inutes: 55   Total Treatment Minutes: 55       [] EVAL (LOW) 88933 (typically 20 minutes face-to-face)  [] EVAL (MOD) 26330 (typically 30 minutes face-to-face)  [] EVAL (HIGH) 64165 (typically 45 minutes face-to-face)  [] RE-EVAL     [x] NU(63448) x  2   [] DRY NEEDLE 1 OR 2 MUSCLES  [x] NMR (22588) x 1    [] DRY NEEDLE 3+ MUSCLES  [x] Manual (53578) x 1      [] TA (36173) x     [] Mech Traction (34147)  [] ES(attended) (06892)     [] ES (un) (15534):   [] VASO (23950)  [] Other:      GOALS:  Patient stated goal: return to pain free throwing program  [] Progressing: [] Met: [] Not Met: [] Adjusted    Therapist goals for Patient:   Short Term Goals: To be achieved in: 2 weeks  1. Independent in HEP and progression per patient tolerance, in order to prevent re-injury. [] Progressing: [] Met: [] Not Met: [] Adjusted  2. Patient will have a decrease in pain to facilitate improvement in movement, function, and ADLs as indicated by Functional Deficits. [] Progressing: [] Met: [] Not Met: [] Adjusted    Long Term Goals:  To be achieved in: 4-6 weeks  1. Disability index score of 5% or less for the Quick DASH to assist with reaching prior level of function. [] Progressing: [] Met: [] Not Met: [] Adjusted  2. Patient will demonstrate increased [facility]OM [facility] to allow for proper joint functioning as indicated by Functional Deficits. [] Progressing: [] Met: [] Not Met: [] Adjusted  3. Patient will demonstrate an increase in NM recruitment/activation and overall GH and scapular strength to within n5lbs HHD or WNL for proper functional mobility as indicated by patients Functional Deficits. [] Progressing: [] Met: [] Not Met: [] Adjusted  4. Patient will return to throwing, weight training activities without increased symptoms or restriction. [] Progressing: [] Met: [] Not Met: [] Adjusted      ASSESSMENT:  Good tolerance to treatment. Progressing baseline strength as tolerated and form allows. Quick fatigue w/ active end range elbow extension, no symptoms. Cues needed for scap position w/ RTC strength. Demonstrating full active elbow ROM without symptoms. Return to Play: (if applicable)   [x]  Stage 1: Intro to Strength   []  Stage 2: Dynamic Strength and Intro to Plyometrics   []  Stage 3: Advanced Plyometrics and Intro to Throwing   []  Stage 4: Sport specific Training/Return to Sport     []  Ready to Return to Play, [employer]   []  Not Ready for Return to Sports   Comments:      Treatment/Activity Tolerance:  [x] Patient tolerated treatment well [] Patient limited by fatique  [] Patient limited by pain  [] Patient limited by other medical complications  [] Other:     Overall Progression Towards Functional goals/ Treatment Progress Update:  [] Patient is progressing as expected towards functional goals listed. [] Progression is slowed due to complexities/Impairments listed. [] Progression has been slowed due to co-morbidities.   [x] Plan just implemented, too soon to assess goals progression <30days   [] Goals require adjustment due to lack of progress  [] Patient is not progressing as expected and requires additional follow up with physician  [] Other    Prognosis for POC: [x] Good [] Fair  [] Poor    Patient requires continued skilled intervention: [x] Yes  [] No      PLAN: See eval  [x] Continue per plan of care [] Alter current plan (see comments)  [] Plan of care initiated [] Hold pending MD visit [] Discharge    Electronically signed by: Suze Merino PT     Note: If patient does not return for scheduled/recommended follow up visits, this note will serve as a discharge from care along with the most recent update on progress.

## 2021-05-06 ENCOUNTER — HOSPITAL ENCOUNTER (OUTPATIENT)
Dept: PHYSICAL THERAPY | Age: 26
Setting detail: THERAPIES SERIES
Discharge: HOME | End: 2021-05-06
Payer: COMMERCIAL

## 2021-05-06 PROCEDURE — 97110 THERAPEUTIC EXERCISES: CPT

## 2021-05-06 PROCEDURE — 97140 MANUAL THERAPY 1/> REGIONS: CPT

## 2021-05-06 PROCEDURE — 97112 NEUROMUSCULAR REEDUCATION: CPT

## 2021-05-06 NOTE — FLOWSHEET NOTE
[address] touches/wall walks 2 5 [place]; bosu           Finger web green   Pron/sup water stick w/ shoulder flex 2 10    AROM flexion 1 20    3 way bicep 2 10 6#               Manual Intervention  (38117)  Min: 18'      Shld /GH Mobs      Post Cap mobs      Thoracic/Rib manipualtion      CT MT/Mobs      PROM MT/wrist PROM  5  Shoulder, elbow, wrist   Elbow mobs 8  End range ext/flex   IASTM 5  R bicep, forearm   NMR re-education (20712)  Min: 10      T-spine Ext      GH depress/compress      Scap/GH NMR      Body blade      Wall ball roll 1 20 Yellow sand ball    Wall Ball bounce 2 20 ER green   Ball drops 2 20 HAB/Y   Lauren Scap Bio      Floor Snow angels-sliders            Therapeutic Activity (22409)  Min:      UE throwing porgression      Dynamic UE stability      Earthquake Bar      Bodyblade                Therapeutic Exercise and NMR EXR  [x] (64234) Provided verbal/tactile cueing for activities related to strengthening, flexibility, endurance, ROM  for improvements in scapular, scapulothoracic and UE control with self care, reaching, carrying, lifting, house/yardwork, driving/computer work. [x] (53134) Provided verbal/tactile cueing for activities related to improving balance, coordination, kinesthetic sense, posture, motor skill, proprioception  to assist with  scapular, scapulothoracic and UE control with self care, reaching, carrying, lifting, house/yardwork, driving/computer work. Therapeutic Activities:    [] (65418 or 38731) Provided verbal/tactile cueing for activities related to improving balance, coordination, kinesthetic sense, posture, motor skill, proprioception and motor activation to allow for proper function of scapular, scapulothoracic and UE control with self care, carrying, lifting, driving/computer work.      Home Exercise Program:    [x] (17615) Reviewed/Progressed HEP activities related to strengthening, flexibility, endurance, ROM of scapular, scapulothoracic and UE control with self care, reaching, carrying, lifting, house/yardwork, driving/computer work  [] (54383) Reviewed/Progressed HEP activities related to improving balance, coordination, kinesthetic sense, posture, motor skill, proprioception of scapular, scapulothoracic and UE control with self care, reaching, carrying, lifting, house/yardwork, driving/computer work      Manual Treatments:  PROM / STM / Oscillations-Mobs:  G-I, II, III, IV (PA's, Inf., Post.)  [] ([id number]) Provided manual therapy to mobilize soft tissue/joints of cervical/CT, scapular GHJ and UE for the purpose of modulating pain, promoting relaxation,  increasing ROM, reducing/eliminating soft tissue swelling/inflammation/restriction, improving soft tissue extensibility and allowing for proper ROM for normal function with self care, reaching, carrying, lifting, house/yardwork, driving/computer work    Modalities:      Charges:  Timed Code Treatment M inutes: 55   Total Treatment Minutes: 55       [] EVAL (LOW) 04446 (typically 20 minutes face-to-face)  [] EVAL (MOD) 81055 (typically 30 minutes face-to-face)  [] EVAL (HIGH) [id number] (typically 45 minutes face-to-face)  [] RE-EVAL     [x] RZ(51889) x  2   [] DRY NEEDLE 1 OR 2 MUSCLES  [x] NMR (28405) x 1    [] DRY NEEDLE 3+ MUSCLES  [x] Manual (56651) x 1      [] TA (46275) x     [] Mech Traction (39088)  [] ES(attended) (40765)     [] ES (un) (49267):   [] VASO (69725)  [] Other:      GOALS:  Patient stated goal: return to pain free throwing program  [] Progressing: [] Met: [] Not Met: [] Adjusted    Therapist goals for Patient:   Short Term Goals: To be achieved in: 2 weeks  1. Independent in HEP and progression per patient tolerance, in order to prevent re-injury. [] Progressing: [] Met: [] Not Met: [] Adjusted  2. Patient will have a decrease in pain to facilitate improvement in movement, function, and ADLs as indicated by Functional Deficits.   [] Progressing: [] Met: [] Not Met: [] Adjusted    Long Term Goals: To be achieved in: 4-6 weeks  1. Disability index score of 5% or less for the Quick DASH to assist with reaching prior level of function. [] Progressing: [] Met: [] Not Met: [] Adjusted  2. Patient will demonstrate increased AROM to [facility] to allow for proper joint functioning as indicated by Functional Deficits. [] Progressing: [] Met: [] Not Met: [] Adjusted  3. Patient will demonstrate an increase in NM recruitment/activation and overall GH and scapular strength to within n5lbs HHD or WNL for proper functional mobility as indicated by patients Functional Deficits. [] Progressing: [] Met: [] Not Met: [] Adjusted  4. Patient will return to throwing, weight training activities without increased symptoms or restriction. [] Progressing: [] Met: [] Not Met: [] Adjusted      ASSESSMENT:  Good tolerance to treatment. Progressing baseline strength as tolerated and form allows. Quick fatigue w/ active end range elbow extension, no symptoms. Cues needed for scap position w/ RTC strength. Demonstrating full active elbow ROM without symptoms. Return to Play: (if applicable)   [x]  Stage 1: Intro to Strength   []  Stage 2: Dynamic Strength and Intro to Plyometrics   []  Stage 3: Advanced Plyometrics and Intro to Throwing   []  Stage 4: Sport specific Training/Return to Sport     []  Ready to Return to Play, [employer]   []  Not Ready for Return to Sports   Comments:      Treatment/Activity Tolerance:  [x] Patient tolerated treatment well [] Patient limited by fatique  [] Patient limited by pain  [] Patient limited by other medical complications  [] Other:     Overall Progression Towards Functional goals/ Treatment Progress Update:  [] Patient is progressing as expected towards functional goals listed. [] Progression is slowed due to complexities/Impairments listed. [] Progression has been slowed due to co-morbidities.   [x] Plan just implemented, too soon to assess goals progression <30days [] Goals require adjustment due to lack of progress  [] Patient is not progressing as expected and requires additional follow up with physician  [] Other    Prognosis for POC: [x] Good [] Fair  [] Poor    Patient requires continued skilled intervention: [x] Yes  [] No      PLAN: See eval  [x] Continue per plan of care [] Alter current plan (see comments)  [] Plan of care initiated [] Hold pending MD visit [] Discharge    Electronically signed by: Juan Sampson PT     Note: If patient does not return for scheduled/recommended follow up visits, this note will serve as a discharge from care along with the most recent update on progress.

## 2021-05-11 ENCOUNTER — HOSPITAL ENCOUNTER (OUTPATIENT)
Dept: PHYSICAL THERAPY | Age: 26
Setting detail: THERAPIES SERIES
Discharge: HOME | End: 2021-05-11
Payer: COMMERCIAL

## 2021-05-11 PROCEDURE — 97110 THERAPEUTIC EXERCISES: CPT

## 2021-05-11 PROCEDURE — 97112 NEUROMUSCULAR REEDUCATION: CPT

## 2021-05-11 PROCEDURE — 97140 MANUAL THERAPY 1/> REGIONS: CPT

## 2021-05-11 NOTE — FLOWSHEET NOTE
[facility] [address] [address] [place]  Phone: [phone] Fax: [phone]      Physical Therapy Treatment Note/ Progress Report:     Date:  2021    Patient Name:  Talon Mixon    :  1995  MRN: [id number]  Restrictions/Precautions:    Medical/Treatment Diagnosis Information:  · Diagnosis: s/p right Eleazar Led  · Treatment Diagnosis: Y54.917  Insurance/Certification information:  PT Insurance Information: [place]  Physician Information:  Referring Practitioner: Kvng Rojas MD  Plan of care signed (Y/N):     Date of Patient follow up with Physician:      Progress Report: []  Yes  [x]  No     Date Range for reporting period:  Beginning:  3/24/2021  Ending:      Progress report due (10 Rx/or 30 days whichever is less):      Recertification due (POC duration/ or 90 days whichever is less): 2021     Visit # Insurance Allowable Auth Needed   10  []Yes    []No     Pain level:  0/10     SUBJECTIVE: 8 weeks postop today. Still feeling really good, no tightness/tingling.       OBJECTIVE: See eval   Observation:   Test measurements:      2021  ROM Left Right   Shoulder Flex WFL Slightly limited   Shoulder Abd WFL Tight at end range   [facility]   Shoulder IR [facility] WFL   Elbow flex    Elbow ext WFL 1         RESTRICTIONS/PRECAUTIONS: follow [place] protocol for Eleazar Led    Exercises/Interventions:   Therapeutic Ex (85805)  Min: 30 Sets/sec Reps CUES/Notes   UBE 4                       T- band Row/pinch      T- band lower pinch  TB elbow ext  TB shoulder ext   1  1   20  20   Green  green   T- band ER activation  IR 1  1 20  20 Green  Green   Supine SA punch w/ TB 2-3 10 Yellow KB w/ red TB flex   SL ER/SL punch 3 10 4#   Prone Rows/ext on TB 1 20 6#   Prone HAB 1 20 5#   Seat Table slides/Wall Slides      Seated HH Depression      No Money 5 15 [place]    Scap Wall Lat touches/wall walks 2 5 [place]; bosu   Standing D2 flex w/ ball hold 2 10 Red TB   Half kneel ER 90/90  2-3104#       Pron/sup water stick w/ shoulder flex 2 10          3 way bicep 2 10 6#               Manual Intervention  (92353)  Min: 8'      Shld /GH Mobs      Post Cap mobs      Thoracic/Rib manipualtion      CT MT/Mobs      PROM MT/wrist PROM    Shoulder, elbow, wrist   Elbow mobs 8  End range ext/flex   IASTM   R bicep, forearm   NMR re-education (00705)  Min: 15      T-spine Ext      GH depress/compress      Murphy carry 2 laps  Orange w/ yellow KB   Body blade 20 3 black   Wall ball roll 1 20 red   Wall Ball bounce 2 20 Red; flew to abd, ER   Ball drops 2 20 T/Y; 1 set short range, 1 set increased range    Lauren Scap Bio      Floor Snow angels-sliders            Therapeutic Activity (80724)  Min:      UE throwing porgression      Dynamic UE stability      Earthquake Bar      Bodyblade                Therapeutic Exercise and NMR EXR  [x] (51004) Provided verbal/tactile cueing for activities related to strengthening, flexibility, endurance, ROM  for improvements in scapular, scapulothoracic and UE control with self care, reaching, carrying, lifting, house/yardwork, driving/computer work. [x] (77522) Provided verbal/tactile cueing for activities related to improving balance, coordination, kinesthetic sense, posture, motor skill, proprioception  to assist with  scapular, scapulothoracic and UE control with self care, reaching, carrying, lifting, house/yardwork, driving/computer work. Therapeutic Activities:    [] (03645 or 69383) Provided verbal/tactile cueing for activities related to improving balance, coordination, kinesthetic sense, posture, motor skill, proprioception and motor activation to allow for proper function of scapular, scapulothoracic and UE control with self care, carrying, lifting, driving/computer work.      Home Exercise Program:    [x] (20778) Reviewed/Progressed HEP activities related to strengthening, flexibility, endurance, ROM of scapular, scapulothoracic and UE control with self care, reaching, carrying, lifting, house/yardwork, driving/computer work  [] (39330) Reviewed/Progressed HEP activities related to improving balance, coordination, kinesthetic sense, posture, motor skill, proprioception of scapular, scapulothoracic and UE control with self care, reaching, carrying, lifting, house/yardwork, driving/computer work      Manual Treatments:  PROM / STM / Oscillations-Mobs:  G-I, II, III, IV (PA's, Inf., Post.)  [] (46890) Provided manual therapy to mobilize soft tissue/joints of cervical/CT, scapular GHJ and UE for the purpose of modulating pain, promoting relaxation,  increasing ROM, reducing/eliminating soft tissue swelling/inflammation/restriction, improving soft tissue extensibility and allowing for proper ROM for normal function with self care, reaching, carrying, lifting, house/yardwork, driving/computer work    Modalities:      Charges:  Timed Code Treatment M inutes: 50   Total Treatment Minutes: 50       [] EVAL (LOW) 94963 (typically 20 minutes face-to-face)  [] EVAL (MOD) 09042 (typically 30 minutes face-to-face)  [] EVAL (HIGH) 48618 (typically 45 minutes face-to-face)  [] RE-EVAL     [x] CA(73873) x  1   [] DRY NEEDLE 1 OR 2 MUSCLES  [x] NMR (17920) x 1    [] DRY NEEDLE 3+ MUSCLES  [x] Manual (85818) x 1      [] TA (90581) x     [] Mech Traction (09150)  [] ES(attended) (30993)     [] ES (un) (64607):   [] VASO (88778)  [] Other:      GOALS:  Patient stated goal: return to pain free throwing program  [] Progressing: [] Met: [] Not Met: [] Adjusted    Therapist goals for Patient:   Short Term Goals: To be achieved in: 2 weeks  1. Independent in HEP and progression per patient tolerance, in order to prevent re-injury. [] Progressing: [] Met: [] Not Met: [] Adjusted  2.  Patient will have a decrease in pain to facilitate improvement in movement, function, and ADLs as indicated by Functional Deficits. [] Progressing: [] Met: [] Not Met: [] Adjusted    Long Term Goals: To be achieved in: 4-6 weeks  1. Disability index score of 5% or less for the Quick DASH to assist with reaching prior level of function. [] Progressing: [] Met: [] Not Met: [] Adjusted  2. Patient will demonstrate increased AROM to [facility] to allow for proper joint functioning as indicated by Functional Deficits. [] Progressing: [] Met: [] Not Met: [] Adjusted  3. Patient will demonstrate an increase in NM recruitment/activation and overall GH and scapular strength to within n5lbs HHD or WNL for proper functional mobility as indicated by patients Functional Deficits. [] Progressing: [] Met: [] Not Met: [] Adjusted  4. Patient will return to throwing, weight training activities without increased symptoms or restriction. [] Progressing: [] Met: [] Not Met: [] Adjusted      ASSESSMENT:  Good tolerance to treatment. Progressing baseline strength as tolerated and form allows. Quick fatigue w/ active end range elbow extension, no symptoms. Cues needed for scap position w/ RTC strength. Demonstrating full active elbow ROM without symptoms. RTC w/ appropriate soreness at end of session. Return to Play: (if applicable)   [x]  Stage 1: Intro to Strength   []  Stage 2: Dynamic Strength and Intro to Plyometrics   []  Stage 3: Advanced Plyometrics and Intro to Throwing   []  Stage 4: Sport specific Training/Return to Sport     []  Ready to Return to Play, [employer]   []  Not Ready for Return to Sports   Comments:      Treatment/Activity Tolerance:  [x] Patient tolerated treatment well [] Patient limited by fatique  [] Patient limited by pain  [] Patient limited by other medical complications  [] Other:     Overall Progression Towards Functional goals/ Treatment Progress Update:  [] Patient is progressing as expected towards functional goals listed. [] Progression is slowed due to complexities/Impairments listed.   [] Progression

## 2021-05-13 ENCOUNTER — HOSPITAL ENCOUNTER (OUTPATIENT)
Dept: PHYSICAL THERAPY | Age: 26
Setting detail: THERAPIES SERIES
Discharge: HOME | End: 2021-05-13
Payer: COMMERCIAL

## 2021-05-13 PROCEDURE — 97140 MANUAL THERAPY 1/> REGIONS: CPT

## 2021-05-13 PROCEDURE — 97110 THERAPEUTIC EXERCISES: CPT

## 2021-05-13 PROCEDURE — 97112 NEUROMUSCULAR REEDUCATION: CPT

## 2021-05-13 NOTE — FLOWSHEET NOTE
[facility] [address] [address]  Phone: [phone] Fax: [phone]      Physical Therapy Treatment Note/ Progress Report:     Date:  2021    Patient Name:  Criselda Triana    :  1995  MRN: [id number]  Restrictions/Precautions:    Medical/Treatment Diagnosis Information:  · Diagnosis: s/p right Efra Bennett  · Treatment Diagnosis: Q78.532  Insurance/Certification information:  PT Insurance Information: [place]  Physician Information:  Referring Practitioner: John Benoit MD  Plan of care signed (Y/N):     Date of Patient follow up with Physician:      Progress Report: []  Yes  [x]  No     Date Range for reporting period:  Beginning:  3/24/2021  Ending:      Progress report due (10 Rx/or 30 days whichever is less):      Recertification due (POC duration/ or 90 days whichever is less): 2021     Visit # Insurance Allowable Auth Needed   11  []Yes    []No     Pain level:  0/10     SUBJECTIVE: 8+ weeks postop today. Still feeling really good,mild soreness in shoulder after last session but no symptoms in elbow.      OBJECTIVE: See eval   Observation:   Test measurements:      2021  ROM Left Right   Shoulder Flex WFL Slightly limited   Shoulder Abd WFL Tight at end range   [facility]   Shoulder IR [facility] WFL   Elbow flex    Elbow ext WFL 1         RESTRICTIONS/PRECAUTIONS: follow [place] protocol for Efra Bennett    Exercises/Interventions:   Therapeutic Ex (39389)  Min: 30 Sets/sec Reps CUES/Notes   UBE 4                 TB tricep ext 2 10 Green; 2 planes   T- band Row/pinch      T- band lower pinch  TB elbow ext  TB shoulder ext   1  1   20  20   Green  green   T- band ER activation  IR 1  1 20  20 Green  Green   Supine SA punch w/ TB 2-3 10 Yellow KB w/ red TB flex   SL ER/SL punch 3 10 4#   Prone Rows/ext on TB 1 20 6#   Prone HAB 1 20 5#   Seat Table slides/Wall Slides      Seated HH Depression      No Money 5 15 [place]    Scap Wall Lat touches/wall walks 2 5 [place]; bosu   Standing D2 flex w/ ball hold 2 10 Red TB   Half kneel ER 90/90  2-3104#       Pron/sup water stick w/ shoulder flex 2 10          3 way bicep 2 10 6#               Manual Intervention  (00950)  Min: 8'      Shld /GH Mobs      Post Cap mobs      Thoracic/Rib manipualtion      CT MT/Mobs      PROM MT/wrist PROM    Shoulder, elbow, wrist   Elbow mobs 8  End range ext/flex   IASTM   R bicep, forearm   NMR re-education (06450)  Min: 15      T-spine Ext      GH depress/compress      Murphy carry 2 laps  Orange w/ yellow KB   Body blade 20 3 black   Wall ball roll 1 20 red   Wall Ball bounce 2 20 Red; flew to abd, ER   Ball drops 2 20 T/Y; 1 set short range, 1 set increased range    Lauren Scap Bio      Floor Snow angels-sliders            Therapeutic Activity (17762)  Min:      UE throwing porgression      Dynamic UE stability      Earthquake Bar      Bodyblade                Therapeutic Exercise and NMR EXR  [x] (08594) Provided verbal/tactile cueing for activities related to strengthening, flexibility, endurance, ROM  for improvements in scapular, scapulothoracic and UE control with self care, reaching, carrying, lifting, house/yardwork, driving/computer work. [x] (20895) Provided verbal/tactile cueing for activities related to improving balance, coordination, kinesthetic sense, posture, motor skill, proprioception  to assist with  scapular, scapulothoracic and UE control with self care, reaching, carrying, lifting, house/yardwork, driving/computer work. Therapeutic Activities:    [] (42618 or 01686) Provided verbal/tactile cueing for activities related to improving balance, coordination, kinesthetic sense, posture, motor skill, proprioception and motor activation to allow for proper function of scapular, scapulothoracic and UE control with self care, carrying, lifting, driving/computer work.      Home Exercise Program: [x] (73842) Reviewed/Progressed HEP activities related to strengthening, flexibility, endurance, ROM of scapular, scapulothoracic and UE control with self care, reaching, carrying, lifting, house/yardwork, driving/computer work  [] (19323) Reviewed/Progressed HEP activities related to improving balance, coordination, kinesthetic sense, posture, motor skill, proprioception of scapular, scapulothoracic and UE control with self care, reaching, carrying, lifting, house/yardwork, driving/computer work      Manual Treatments:  PROM / STM / Oscillations-Mobs:  G-I, II, III, IV (PA's, Inf., Post.)  [] (02020) Provided manual therapy to mobilize soft tissue/joints of cervical/CT, scapular GHJ and UE for the purpose of modulating pain, promoting relaxation,  increasing ROM, reducing/eliminating soft tissue swelling/inflammation/restriction, improving soft tissue extensibility and allowing for proper ROM for normal function with self care, reaching, carrying, lifting, house/yardwork, driving/computer work    Modalities:      Charges:  Timed Code Treatment M inutes: 50   Total Treatment Minutes: 50       [] EVAL (LOW) 66560 (typically 20 minutes face-to-face)  [] EVAL (MOD) 55635 (typically 30 minutes face-to-face)  [] EVAL (HIGH) 33524 (typically 45 minutes face-to-face)  [] RE-EVAL     [x] SS(89761) x  1   [] DRY NEEDLE 1 OR 2 MUSCLES  [x] NMR (76940) x 1    [] DRY NEEDLE 3+ MUSCLES  [x] Manual (26293) x 1      [] TA (95095) x     [] Mech Traction (58522)  [] ES(attended) (53764)     [] ES (un) (21308):   [] VASO (22576)  [] Other:      GOALS:  Patient stated goal: return to pain free throwing program  [] Progressing: [] Met: [] Not Met: [] Adjusted    Therapist goals for Patient:   Short Term Goals: To be achieved in: 2 weeks  1. Independent in HEP and progression per patient tolerance, in order to prevent re-injury. [] Progressing: [] Met: [] Not Met: [] Adjusted  2.  Patient will have a decrease in pain to facilitate improvement in movement, function, and ADLs as indicated by Functional Deficits. [] Progressing: [] Met: [] Not Met: [] Adjusted    Long Term Goals: To be achieved in: 4-6 weeks  1. Disability index score of 5% or less for the Quick DASH to assist with reaching prior level of function. [] Progressing: [] Met: [] Not Met: [] Adjusted  2. Patient will demonstrate increased AROM to [facility] to allow for proper joint functioning as indicated by Functional Deficits. [] Progressing: [] Met: [] Not Met: [] Adjusted  3. Patient will demonstrate an increase in NM recruitment/activation and overall GH and scapular strength to within n5lbs HHD or WNL for proper functional mobility as indicated by patients Functional Deficits. [] Progressing: [] Met: [] Not Met: [] Adjusted  4. Patient will return to throwing, weight training activities without increased symptoms or restriction. [] Progressing: [] Met: [] Not Met: [] Adjusted      ASSESSMENT:  Good tolerance to treatment. Progressing baseline strength as tolerated and form allows. Quick fatigue w/ active end range elbow extension, no symptoms. Cues needed for scap position w/ RTC strength. Demonstrating full active elbow ROM without symptoms. RTC w/ appropriate soreness at end of session. Return to Play: (if applicable)   [x]  Stage 1: Intro to Strength   []  Stage 2: Dynamic Strength and Intro to Plyometrics   []  Stage 3: Advanced Plyometrics and Intro to Throwing   []  Stage 4: Sport specific Training/Return to Sport     []  Ready to Return to Play, [employer]   []  Not Ready for Return to Sports   Comments:      Treatment/Activity Tolerance:  [x] Patient tolerated treatment well [] Patient limited by fatique  [] Patient limited by pain  [] Patient limited by other medical complications  [] Other:     Overall Progression Towards Functional goals/ Treatment Progress Update:  [] Patient is progressing as expected towards functional goals listed.     [] Progression is slowed due to complexities/Impairments listed. [] Progression has been slowed due to co-morbidities. [x] Plan just implemented, too soon to assess goals progression <30days   [] Goals require adjustment due to lack of progress  [] Patient is not progressing as expected and requires additional follow up with physician  [] Other    Prognosis for POC: [x] Good [] Fair  [] Poor    Patient requires continued skilled intervention: [x] Yes  [] No      PLAN: See eval  [x] Continue per plan of care [] Alter current plan (see comments)  [] Plan of care initiated [] Hold pending MD visit [] Discharge    Electronically signed by: Elina Reynoso PT     Note: If patient does not return for scheduled/recommended follow up visits, this note will serve as a discharge from care along with the most recent update on progress.

## 2021-05-18 ENCOUNTER — HOSPITAL ENCOUNTER (OUTPATIENT)
Dept: PHYSICAL THERAPY | Age: 26
Setting detail: THERAPIES SERIES
Discharge: HOME | End: 2021-05-18
Payer: COMMERCIAL

## 2021-05-18 PROCEDURE — 97140 MANUAL THERAPY 1/> REGIONS: CPT

## 2021-05-18 PROCEDURE — 97112 NEUROMUSCULAR REEDUCATION: CPT

## 2021-05-18 PROCEDURE — 97110 THERAPEUTIC EXERCISES: CPT

## 2021-05-18 NOTE — FLOWSHEET NOTE
[facility] [address] [place] [address]  Phone: [phone] Fax: [phone]      Physical Therapy Treatment Note/ Progress Report:     Date:  2021    Patient Name:  Arturo Penny    :  1995  MRN: [id number]  Restrictions/Precautions:    Medical/Treatment Diagnosis Information:  · Diagnosis: s/p right Zach Reyez  · Treatment Diagnosis: A51.511  Insurance/Certification information:  PT Insurance Information: [place]  Physician Information:  Referring Practitioner: Cliff Hi MD  Plan of care signed (Y/N):     Date of Patient follow up with Physician:      Progress Report: []  Yes  [x]  No     Date Range for reporting period:  Beginning:  3/24/2021  Ending:      Progress report due (10 Rx/or 30 days whichever is less):      Recertification due (POC duration/ or 90 days whichever is less): 2021     Visit # Insurance Allowable Auth Needed   12  []Yes    []No     Pain level:  0/10     SUBJECTIVE: Elbow and shoulder are feeling pretty good today. No new concerns. Pt states that he goes back to the surgeon for 12 week follow-up mid-. OBJECTIVE: passive shoulder flexion a little tight at end-range today. No pain associated.     Observation:   Test measurements:      2021  ROM Left Right   Shoulder Flex WFL Slightly limited   Shoulder Abd WFL Tight at end range   Shoulder ER [facility]   Shoulder IR [facility] WFL   Elbow flex    Elbow ext WFL 1         RESTRICTIONS/PRECAUTIONS: follow [place] protocol for Zach Reyez    Exercises/Interventions:   Therapeutic Ex (97210)  Min: 30 Sets/sec Reps CUES/Notes   UBE 4                 TB tricep ext 2 10 Green; 2 planes   T- band Row/pinch      T- band lower pinch  TB elbow ext  TB shoulder ext   1  1   20  20   Green  green   T- band ER activation  IR 1  1 20  20 Green  Green   Supine SA punch w/ TB 3sec 10 Yellow KB w/ green TB flex   SL ER/SL punch 3 10 4# Prone Rows/ext on TB bilat 1 20 6#   Prone HAB/scaption bilat 1 20 5#   Seat Table slides/Wall Slides      Seated HH Depression      No Money 5 15 [place]    Scap Wall Lat touches/wall walks 2 5 [place]; bosu   Standing D2 flex w/ ball hold 2 10 Red TB   Half kneel ER 90/90  2-3104#       Pron/sup water stick w/ shoulder flex 2 10          3 way bicep 2 10 6#               Manual Intervention  (88222)  Min: 9'      Shld /GH Mobs      Post Cap mobs      Thoracic/Rib manipualtion      CT MT/Mobs      PROM MT/wrist PROM    Shoulder, elbow, wrist   Elbow mobs 3  End range ext/flex   IASTM 6  R medial bicep, forearm   NMR re-education (87895)  Min: 15      T-spine Ext      GH depress/compress      Murphy carry 2 laps  Orange w/ yellow KB   Body blade 20 3 black   Wall ball roll 1 20 red   Wall Ball bounce 2 20 Red; flew to abd, ER   Ball drops 2 20 T/Y; 1 set short range, 1 set increased range    Lauren Scap Bio      Floor Snow angels-sliders            Therapeutic Activity (27890)  Min:      UE throwing porgression      Dynamic UE stability      Earthquake Bar      Bodyblade                Therapeutic Exercise and NMR EXR  [x] (20578) Provided verbal/tactile cueing for activities related to strengthening, flexibility, endurance, ROM  for improvements in scapular, scapulothoracic and UE control with self care, reaching, carrying, lifting, house/yardwork, driving/computer work. [x] (61194) Provided verbal/tactile cueing for activities related to improving balance, coordination, kinesthetic sense, posture, motor skill, proprioception  to assist with  scapular, scapulothoracic and UE control with self care, reaching, carrying, lifting, house/yardwork, driving/computer work.     Therapeutic Activities:    [] (55127 or 63068) Provided verbal/tactile cueing for activities related to improving balance, coordination, kinesthetic sense, posture, motor skill, proprioception and motor activation to allow for proper function of scapular, scapulothoracic and UE control with self care, carrying, lifting, driving/computer work. Home Exercise Program:    [x] (73864) Reviewed/Progressed HEP activities related to strengthening, flexibility, endurance, ROM of scapular, scapulothoracic and UE control with self care, reaching, carrying, lifting, house/yardwork, driving/computer work  [] (71947) Reviewed/Progressed HEP activities related to improving balance, coordination, kinesthetic sense, posture, motor skill, proprioception of scapular, scapulothoracic and UE control with self care, reaching, carrying, lifting, house/yardwork, driving/computer work      Manual Treatments:  PROM / STM / Oscillations-Mobs:  G-I, II, III, IV (PA's, Inf., Post.)  [x] (34623) Provided manual therapy to mobilize soft tissue/joints of cervical/CT, scapular GHJ and UE for the purpose of modulating pain, promoting relaxation,  increasing ROM, reducing/eliminating soft tissue swelling/inflammation/restriction, improving soft tissue extensibility and allowing for proper ROM for normal function with self care, reaching, carrying, lifting, house/yardwork, driving/computer work    Modalities:  none    Charges:  Timed Code Treatment M inutes: 54   Total Treatment Minutes: 54       [] EVAL (LOW) 14724 (typically 20 minutes face-to-face)  [] EVAL (MOD) 19998 (typically 30 minutes face-to-face)  [] EVAL (HIGH) 14079 (typically 45 minutes face-to-face)  [] RE-EVAL     [x] JR(32286) x  2   [] DRY NEEDLE 1 OR 2 MUSCLES  [x] NMR (07908) x 1    [] DRY NEEDLE 3+ MUSCLES  [x] Manual (50443) x 1      [] TA (93977) x     [] Mech Traction (91653)  [] ES(attended) (33847)     [] ES (un) (46473):   [] VASO (92514)  [] Other:      GOALS:  Patient stated goal: return to pain free throwing program  [] Progressing: [] Met: [] Not Met: [] Adjusted    Therapist goals for Patient:   Short Term Goals: To be achieved in: 2 weeks  1.  Independent in HEP and progression per patient tolerance, in order to prevent re-injury. [] Progressing: [] Met: [] Not Met: [] Adjusted  2. Patient will have a decrease in pain to facilitate improvement in movement, function, and ADLs as indicated by Functional Deficits. [] Progressing: [] Met: [] Not Met: [] Adjusted    Long Term Goals: To be achieved in: 4-6 weeks  1. Disability index score of 5% or less for the Quick DASH to assist with reaching prior level of function. [] Progressing: [] Met: [] Not Met: [] Adjusted  2. Patient will demonstrate increased AROM to [facility] to allow for proper joint functioning as indicated by Functional Deficits. [] Progressing: [] Met: [] Not Met: [] Adjusted  3. Patient will demonstrate an increase in NM recruitment/activation and overall GH and scapular strength to within n5lbs HHD or WNL for proper functional mobility as indicated by patients Functional Deficits. [] Progressing: [] Met: [] Not Met: [] Adjusted  4. Patient will return to throwing, weight training activities without increased symptoms or restriction. [] Progressing: [] Met: [] Not Met: [] Adjusted      ASSESSMENT:  Good tolerance to treatment. Progressing baseline strength as tolerated and form allows. Quick fatigue w/ active end range elbow extension, no symptoms. Cues needed for scap position w/ RTC strength. Demonstrating full active elbow ROM without symptoms. No complaints of soreness at conclusion.      Return to Play: (if applicable)   [x]  Stage 1: Intro to Strength   []  Stage 2: Dynamic Strength and Intro to Plyometrics   []  Stage 3: Advanced Plyometrics and Intro to Throwing   []  Stage 4: Sport specific Training/Return to Sport     []  Ready to Return to Play, [employer]   []  Not Ready for Return to Sports   Comments:      Treatment/Activity Tolerance:  [x] Patient tolerated treatment well [] Patient limited by fatique  [] Patient limited by pain  [] Patient limited by other medical complications  [] Other:     Overall Progression Towards Functional goals/ Treatment Progress Update:  [] Patient is progressing as expected towards functional goals listed. [] Progression is slowed due to complexities/Impairments listed. [] Progression has been slowed due to co-morbidities. [x] Plan just implemented, too soon to assess goals progression <30days   [] Goals require adjustment due to lack of progress  [] Patient is not progressing as expected and requires additional follow up with physician  [] Other    Prognosis for POC: [x] Good [] Fair  [] Poor    Patient requires continued skilled intervention: [x] Yes  [] No      PLAN: See eval   [x] Continue per plan of care [] Alter current plan (see comments)  [] Plan of care initiated [] Hold pending MD visit [] Discharge    Electronically signed by: Dianna Wall PTA     Note: If patient does not return for scheduled/recommended follow up visits, this note will serve as a discharge from care along with the most recent update on progress.

## 2021-05-20 ENCOUNTER — HOSPITAL ENCOUNTER (OUTPATIENT)
Dept: PHYSICAL THERAPY | Age: 26
Setting detail: THERAPIES SERIES
Discharge: HOME | End: 2021-05-20
Payer: COMMERCIAL

## 2021-05-20 PROCEDURE — 97140 MANUAL THERAPY 1/> REGIONS: CPT

## 2021-05-20 PROCEDURE — 97110 THERAPEUTIC EXERCISES: CPT

## 2021-05-20 PROCEDURE — 97112 NEUROMUSCULAR REEDUCATION: CPT

## 2021-05-20 NOTE — FLOWSHEET NOTE
KB w/ green TB flex   SL ER/SL punch 3 10 5#   Prone Rows/ext on TB bilat 1 20 7#   Prone HAB/scaption bilat 1 20 6-5#   Seat Table slides/Wall Slides      Seated HH Depression      No Money 5 15 [place]    Scap Wall Lat touches/wall walks Orange; bosu   Standing D2 flex w/ ball hold Red TB   Half kneel ER 90/90  4#       Pron/sup water stick w/ shoulder flex 2 10 Pronation to neutral, supination to neutral.         3 way bicep 2 10 7#               Manual Intervention  (81595)  Min: 9'      Shld /GH Mobs      Post Cap mobs      Thoracic/Rib manipualtion      CT MT/Mobs      PROM MT/wrist PROM    Shoulder, elbow, wrist   Elbow mobs 3  End range ext/flex   IASTM 6  R medial bicep, forearm   NMR re-education (39085)  Min: 12      T-spine Ext      GH depress/compress      Murphy carry 2 laps  Orange w/ yellow KB   Body blade 20 4 0 deg, Big bodyblade    Wall ball roll 1 20 red   Wall Ball bounce 2 20 Red; flex to abd, ER   Ball drops 2 20 T/Y; 1 set short range, 1 set increased range- 21oz   Lauren Scap Bio      Floor Snow angels-sliders            Therapeutic Activity (52995)  Min:      UE throwing porgression      Dynamic UE stability      Earthquake Bar      Bodyblade                Therapeutic Exercise and NMR EXR  [x] (22099) Provided verbal/tactile cueing for activities related to strengthening, flexibility, endurance, ROM  for improvements in scapular, scapulothoracic and UE control with self care, reaching, carrying, lifting, house/yardwork, driving/computer work. [x] (70087) Provided verbal/tactile cueing for activities related to improving balance, coordination, kinesthetic sense, posture, motor skill, proprioception  to assist with  scapular, scapulothoracic and UE control with self care, reaching, carrying, lifting, house/yardwork, driving/computer work.     Therapeutic Activities:    [] (64974 or 17700) Provided verbal/tactile cueing for activities related to improving balance, coordination, kinesthetic

## 2021-05-25 ENCOUNTER — HOSPITAL ENCOUNTER (OUTPATIENT)
Dept: PHYSICAL THERAPY | Age: 26
Setting detail: THERAPIES SERIES
Discharge: HOME | End: 2021-05-25
Payer: COMMERCIAL

## 2021-05-25 PROCEDURE — 97140 MANUAL THERAPY 1/> REGIONS: CPT

## 2021-05-25 PROCEDURE — 97112 NEUROMUSCULAR REEDUCATION: CPT

## 2021-05-25 PROCEDURE — 97110 THERAPEUTIC EXERCISES: CPT

## 2021-05-25 NOTE — FLOWSHEET NOTE
[facility] [address] [place]  Phone: [phone] Fax: [phone]      Physical Therapy Treatment Note/ Progress Report:     Date:  2021    Patient Name:  Young Underwood    :  1995  MRN: [id number]  Restrictions/Precautions:    Medical/Treatment Diagnosis Information:  · Diagnosis: s/p right Olga Nguyen  · Treatment Diagnosis: H90.647  Insurance/Certification information:  PT Insurance Information: [place]  Physician Information:  Referring Practitioner: Emi Leiva MD  Plan of care signed (Y/N):     Date of Patient follow up with Physician:      Progress Report: []  Yes  [x]  No     Date Range for reporting period:  Beginning:  3/24/2021  Ending:      Progress report due (10 Rx/or 30 days whichever is less):      Recertification due (POC duration/ or 90 days whichever is less): 2021     Visit # Insurance Allowable Auth Needed   14  []Yes    []No     Pain level:  0/10     SUBJECTIVE: Elbow and shoulder are feeling pretty good today. No new complaints. Pt has another visit here on Thursday then will be moving home for the summer and will continue PT at [facility] at that point. OBJECTIVE: passive shoulder flexion a little tight at end-range. No pain associated. Distal tricep a little tight upon palpation today.       Observation:   Test measurements:      2021  ROM Left Right   Shoulder Flex WFL Slightly limited   Shoulder Abd WFL Tight at end range   Shoulder ER [facility]   Shoulder IR [facility] WFL   Elbow flex    Elbow ext WFL 1         RESTRICTIONS/PRECAUTIONS: follow [place] protocol for Olga Nguyen    Exercises/Interventions:   Therapeutic Ex (47651)  Min: 35 Sets/sec Reps CUES/Notes   UBE 4                 TB tricep ext 2 10 green; 2 planes   T- band Row/pinch      T- band lower pinch  TB elbow ext  TB shoulder ext   1  1   20  20   Green  black   T- band ER activation  IR 1  1 20  20 Double Green  Green   Serratus punch on BOSU 5sec 12 Red kb, shoulders on BOSU, LE's in bridge   SL ER/SL punch 3 10 5#   Prone Rows/ext on TB bilat 1 20 7#   Prone HAB/scaption bilat 1 20 6-5#   Seat Table slides/Wall Slides      Seated HH Depression      No Money + movement 1 12 Orange band, kneeling on BOSU with toes up, maintaining scap retraction as Pt goes from neutral to 90 deg flexion   Scap Wall Lat touches/wall walks [place]; bosu   Standing D2 flex w/ ball hold Red TB   Half kneel ER 90/90  4#       Pron/sup water stick w/ shoulder flex 2 10 Pronation to neutral, supination to neutral.         3 way bicep 2 10 7#               Manual Intervention  (77530)  Min: 12'      Shld /GH Mobs      Post Cap mobs      Thoracic/Rib manipualtion      CT MT/Mobs      PROM MT/wrist PROM  3  Shoulder flexion   Elbow mobs 3  End range ext/flex   IASTM 6  R distal bicep and tricep, subscap   NMR re-education (44116)  Min: 12      T-spine Ext      GH depress/compress      Murphy carry 2 laps  Orange w/ yellow KB   Body blade 20 4 0 deg, Big bodyblade, green tube @ wrist   Wall ball roll 1 20 green   Wall Ball bounce 2 20 Red; flex to abd, ER   Ball drops in squatted position leaning fwd 2 20 T/Y; 1 set short range, 1 set increased range- 21oz   Lauren Scap Bio      Floor Snow angels-sliders            Therapeutic Activity (83574)  Min:      UE throwing porgression      Dynamic UE stability      Earthquake Bar      Bodyblade                Therapeutic Exercise and NMR EXR  [x] (16577) Provided verbal/tactile cueing for activities related to strengthening, flexibility, endurance, ROM  for improvements in scapular, scapulothoracic and UE control with self care, reaching, carrying, lifting, house/yardwork, driving/computer work.     [x] (96026) Provided verbal/tactile cueing for activities related to improving balance, coordination, kinesthetic sense, posture, motor skill, proprioception  to assist with  scapular, scapulothoracic and UE control with self care, reaching, carrying, lifting, house/yardwork, driving/computer work. Therapeutic Activities:    [] (46575 or 89329) Provided verbal/tactile cueing for activities related to improving balance, coordination, kinesthetic sense, posture, motor skill, proprioception and motor activation to allow for proper function of scapular, scapulothoracic and UE control with self care, carrying, lifting, driving/computer work.      Home Exercise Program:    [x] (11945) Reviewed/Progressed HEP activities related to strengthening, flexibility, endurance, ROM of scapular, scapulothoracic and UE control with self care, reaching, carrying, lifting, house/yardwork, driving/computer work  [] (23715) Reviewed/Progressed HEP activities related to improving balance, coordination, kinesthetic sense, posture, motor skill, proprioception of scapular, scapulothoracic and UE control with self care, reaching, carrying, lifting, house/yardwork, driving/computer work      Manual Treatments:  PROM / STM / Oscillations-Mobs:  G-I, II, III, IV (PA's, Inf., Post.)  [x] (44265) Provided manual therapy to mobilize soft tissue/joints of cervical/CT, scapular GHJ and UE for the purpose of modulating pain, promoting relaxation,  increasing ROM, reducing/eliminating soft tissue swelling/inflammation/restriction, improving soft tissue extensibility and allowing for proper ROM for normal function with self care, reaching, carrying, lifting, house/yardwork, driving/computer work    Modalities:  none    Charges:  Timed Code Treatment M inutes: 58   Total Treatment Minutes: 60       [] EVAL (LOW) 12701 (typically 20 minutes face-to-face)  [] EVAL (MOD) 68949 (typically 30 minutes face-to-face)  [] EVAL (HIGH) 64990 (typically 45 minutes face-to-face)  [] RE-EVAL     [x] TC(51834) x  2   [] DRY NEEDLE 1 OR 2 MUSCLES  [x] NMR (24632) x 1    [] DRY NEEDLE 3+ MUSCLES  [x] Manual (80961) x 1      [] TA (14139) x     [] Not Ready for Return to Sports   Comments:      Treatment/Activity Tolerance:  [x] Patient tolerated treatment well [] Patient limited by fatique  [] Patient limited by pain  [] Patient limited by other medical complications  [] Other:     Overall Progression Towards Functional goals/ Treatment Progress Update:  [] Patient is progressing as expected towards functional goals listed. [] Progression is slowed due to complexities/Impairments listed. [] Progression has been slowed due to co-morbidities. [x] Plan just implemented, too soon to assess goals progression <30days   [] Goals require adjustment due to lack of progress  [] Patient is not progressing as expected and requires additional follow up with physician  [] Other    Prognosis for POC: [x] Good [] Fair  [] Poor    Patient requires continued skilled intervention: [x] Yes  [] No      PLAN: See eval   [x] Continue per plan of care [] Alter current plan (see comments)  [] Plan of care initiated [] Hold pending MD visit [] Discharge    Electronically signed by: Liza Blank PTA     Note: If patient does not return for scheduled/recommended follow up visits, this note will serve as a discharge from care along with the most recent update on progress.

## 2021-05-27 ENCOUNTER — HOSPITAL ENCOUNTER (OUTPATIENT)
Dept: PHYSICAL THERAPY | Age: 26
Setting detail: THERAPIES SERIES
Discharge: HOME | End: 2021-05-27
Payer: COMMERCIAL

## 2021-05-27 PROCEDURE — 97140 MANUAL THERAPY 1/> REGIONS: CPT

## 2021-05-27 PROCEDURE — 97112 NEUROMUSCULAR REEDUCATION: CPT

## 2021-05-27 PROCEDURE — 97110 THERAPEUTIC EXERCISES: CPT

## 2021-05-27 NOTE — FLOWSHEET NOTE
[facility] [address] [place]  Phone: [phone] Fax: [phone]      Physical Therapy Treatment Note/ Progress Report:     Date:  2021    Patient Name:  Alisha Campa    :  1995  MRN: [id number]  Restrictions/Precautions:    Medical/Treatment Diagnosis Information:  · Diagnosis: s/p right Jose Luis Saha  · Treatment Diagnosis: G76.742  Insurance/Certification information:  PT Insurance Information: [place]  Physician Information:  Referring Practitioner: Harish Melgar MD  Plan of care signed (Y/N):     Date of Patient follow up with Physician:      Progress Report: [x]  Yes  []  No     Date Range for reporting period:  Beginning:  3/24/2021  Ending:      Progress report due (10 Rx/or 30 days whichever is less):      Recertification due (POC duration/ or 90 days whichever is less): 2021     Visit # Insurance Allowable Auth Needed   15  []Yes    []No     Pain level:  0/10     SUBJECTIVE:  Pt states he is still feeling good. Today is last visit at this clinic before he goes home, states he will be continuing PT at [facility] in [facility]. OBJECTIVE: passive shoulder flexion a little tight at end-range. No pain associated. Distal tricep a little tight upon palpation today.       Observation:   Test measurements:      2021  ROM Left Right   Shoulder Flex WFL Slightly limited   Shoulder Abd WFL Tight at end range   [facility]   [facility]   Elbow flex    [facility]       2021  ROM Left Right   Shoulder Flex [facility] WFL; tight   Shoulder Abd [facility] WFL; tight    [facility]   Shoulder IR [facility] WFL   Elbow flex [facility] WFL   [facility]   Strength  Left Right   Shoulder Flex 35.1 39.1   Shoulder Scap 25.5 38.5   Shoulder ER 28.1 34.1   Shoulder IR 27.1 29.2   Elbow flex 28.0 30.6   Elbow ext 31.9 32.6    with elbow bent 116 105     Quick DASH: 0%      RESTRICTIONS/PRECAUTIONS: follow [facility] protocol for [employer]    Exercises/Interventions:   Therapeutic Ex (05912)  Min: 35 Sets/sec Reps CUES/Notes   UBE 4                 TB tricep ext 2 10 green; 2 planes   T- band Row/pinch      T- band lower pinch  TB elbow ext  TB shoulder ext   1  1   20  20   Green  black   T- band ER activation  IR 1  1 20  20 Double Green  Green   Serratus punch on BOSU 5sec 12 Red kb, shoulders on BOSU, LE's in bridge   SL ER/SL punch 3 10 5#   Prone Rows/ext on TB bilat 1 20 7#   Prone HAB/scaption bilat 1 20 6-5#   Seat Table slides/Wall Slides      Seated HH Depression      No Money + movement 1 12 Orange band, kneeling on BOSU with toes up, maintaining scap retraction as Pt goes from neutral to 90 deg flexion   Scap Wall Lat touches/wall walks Orange; bosu   Standing D2 flex w/ ball hold Red TB   Half kneel ER 90/90  4#       Pron/sup water stick w/ shoulder flex 2 10 Pronation to neutral, supination to neutral.         3 way bicep 2 10 7#               Manual Intervention  (41874)  Min: 12'      Shld /GH Mobs      Post Cap mobs      Thoracic/Rib manipualtion      CT MT/Mobs      PROM MT/wrist PROM  3  Shoulder flexion   Elbow mobs 3  End range ext/flex   IASTM 6  R distal bicep and tricep, subscap   NMR re-education (77854)  Min: 15      T-spine Ext      OH carry earthquake bar 3  Red KB   Murphy carry 2 laps  Orange w/ yellow KB   Body blade 20 4 0 deg, Big bodyblade, green tube @ wrist   Wall ball roll 1 20 green   Wall Ball bounce 2 20 Red; flex to abd, ER   Ball drops in squatted position leaning fwd 2 20 T/Y; 1 set short range, 1 set increased range- 21oz   Box walk overs 2 5 4'' box   Eccentric control 2 10 Kneeling on bosu- multidirectional         Therapeutic Activity (90505)  Min:      UE throwing porgression      Dynamic UE stability      Earthquake Bar      Bodyblade                Therapeutic Exercise and NMR EXR  [x] (18528) Provided verbal/tactile cueing for activities related to strengthening, flexibility, endurance, ROM  for improvements in scapular, scapulothoracic and UE control with self care, reaching, carrying, lifting, house/yardwork, driving/computer work. [x] (26904) Provided verbal/tactile cueing for activities related to improving balance, coordination, kinesthetic sense, posture, motor skill, proprioception  to assist with  scapular, scapulothoracic and UE control with self care, reaching, carrying, lifting, house/yardwork, driving/computer work. Therapeutic Activities:    [] (92125 or 14184) Provided verbal/tactile cueing for activities related to improving balance, coordination, kinesthetic sense, posture, motor skill, proprioception and motor activation to allow for proper function of scapular, scapulothoracic and UE control with self care, carrying, lifting, driving/computer work.      Home Exercise Program:    [x] (20798) Reviewed/Progressed HEP activities related to strengthening, flexibility, endurance, ROM of scapular, scapulothoracic and UE control with self care, reaching, carrying, lifting, house/yardwork, driving/computer work  [] (10472) Reviewed/Progressed HEP activities related to improving balance, coordination, kinesthetic sense, posture, motor skill, proprioception of scapular, scapulothoracic and UE control with self care, reaching, carrying, lifting, house/yardwork, driving/computer work      Manual Treatments:  PROM / STM / Oscillations-Mobs:  G-I, II, III, IV (PA's, Inf., Post.)  [x] (10875) Provided manual therapy to mobilize soft tissue/joints of cervical/CT, scapular GHJ and UE for the purpose of modulating pain, promoting relaxation,  increasing ROM, reducing/eliminating soft tissue swelling/inflammation/restriction, improving soft tissue extensibility and allowing for proper ROM for normal function with self care, reaching, carrying, lifting, house/yardwork, driving/computer work    Modalities:  none    Charges:  Timed Code Treatment M inutes: 58   Total Treatment Minutes: 60       [] EVAL (LOW) 03957 (typically 20 minutes face-to-face)  [] EVAL (MOD) 90202 (typically 30 minutes face-to-face)  [] EVAL (HIGH) 80508 (typically 45 minutes face-to-face)  [] RE-EVAL     [x] KL(31751) x  2   [] DRY NEEDLE 1 OR 2 MUSCLES  [x] NMR (89738) x 1    [] DRY NEEDLE 3+ MUSCLES  [x] Manual (30468) x 1      [] TA (48869) x     [] Mech Traction (27274)  [] ES(attended) (36714)     [] ES (un) (41408):   [] VASO (82822)  [] Other:      GOALS:  Patient stated goal: return to pain free throwing program  [] Progressing: [] Met: [] Not Met: [] Adjusted    Therapist goals for Patient:   Short Term Goals: To be achieved in: 2 weeks  1. Independent in HEP and progression per patient tolerance, in order to prevent re-injury. [] Progressing: [] Met: [] Not Met: [] Adjusted  2. Patient will have a decrease in pain to facilitate improvement in movement, function, and ADLs as indicated by Functional Deficits. [] Progressing: [] Met: [] Not Met: [] Adjusted    Long Term Goals: To be achieved in: 4-6 weeks  1. Disability index score of 5% or less for the Quick DASH to assist with reaching prior level of function. [] Progressing: [] Met: [] Not Met: [] Adjusted  2. Patient will demonstrate increased AROM to [facility] to allow for proper joint functioning as indicated by Functional Deficits. [] Progressing: [] Met: [] Not Met: [] Adjusted  3. Patient will demonstrate an increase in NM recruitment/activation and overall GH and scapular strength to within n5lbs HHD or WNL for proper functional mobility as indicated by patients Functional Deficits. [] Progressing: [] Met: [] Not Met: [] Adjusted  4. Patient will return to throwing, weight training activities without increased symptoms or restriction. [] Progressing: [] Met: [] Not Met: [] Adjusted      ASSESSMENT:  Good tolerance to progression of program today.   Progressing baseline strength as tolerated and form allows. Cues needed for scap position w/ RTC strength. No complaints of soreness at conclusion. Tolerated progression of overhead stability and closed chain strengthening without symptoms or tightness. Return to Play: (if applicable)   [x]  Stage 1: Intro to Strength   []  Stage 2: Dynamic Strength and Intro to Plyometrics   []  Stage 3: Advanced Plyometrics and Intro to Throwing   []  Stage 4: Sport specific Training/Return to Sport     []  Ready to Return to Play, [employer]   []  Not Ready for Return to Sports   Comments:      Treatment/Activity Tolerance:  [x] Patient tolerated treatment well [] Patient limited by fatique  [] Patient limited by pain  [] Patient limited by other medical complications  [] Other:     Overall Progression Towards Functional goals/ Treatment Progress Update:  [] Patient is progressing as expected towards functional goals listed. [] Progression is slowed due to complexities/Impairments listed. [] Progression has been slowed due to co-morbidities. [x] Plan just implemented, too soon to assess goals progression <30days   [] Goals require adjustment due to lack of progress  [] Patient is not progressing as expected and requires additional follow up with physician  [] Other    Prognosis for POC: [x] Good [] Fair  [] Poor    Patient requires continued skilled intervention: [x] Yes  [] No      PLAN: See eval   [x] Continue per plan of care [] Alter current plan (see comments)  [] Plan of care initiated [] Hold pending MD visit [] Discharge    Electronically signed by: Cierra Hansen PT     Note: If patient does not return for scheduled/recommended follow up visits, this note will serve as a discharge from care along with the most recent update on progress.

## 2021-06-01 ENCOUNTER — HOSPITAL ENCOUNTER (OUTPATIENT)
Dept: PHYSICAL THERAPY | Age: 26
Setting detail: THERAPIES SERIES
Discharge: HOME | End: 2021-06-01
Payer: COMMERCIAL

## 2021-06-01 PROCEDURE — 97112 NEUROMUSCULAR REEDUCATION: CPT

## 2021-06-01 PROCEDURE — 97110 THERAPEUTIC EXERCISES: CPT

## 2021-06-03 ENCOUNTER — HOSPITAL ENCOUNTER (OUTPATIENT)
Dept: PHYSICAL THERAPY | Age: 26
Setting detail: THERAPIES SERIES
Discharge: HOME | End: 2021-06-03
Payer: COMMERCIAL

## 2021-06-03 PROCEDURE — 97112 NEUROMUSCULAR REEDUCATION: CPT

## 2021-06-03 PROCEDURE — 97110 THERAPEUTIC EXERCISES: CPT

## 2021-06-03 NOTE — FLOWSHEET NOTE
[facility] [address]  Phone: [phone] Fax: [phone]      Physical Therapy Treatment Note/ Progress Report:     Date:  6/3/2021    Patient Name:  Fernanda Stanley    :  1995  MRN: [id number]  Restrictions/Precautions:    Medical/Treatment Diagnosis Information:  · Diagnosis: s/p right Antonella Jurist  · Treatment Diagnosis: E36.512  Insurance/Certification information:  PT Insurance Information: [place]  Physician Information:  Referring Practitioner: Bethany Urbina MD  Plan of care signed (Y/N):     Date of Patient follow up with Physician:      Progress Report: []  Yes  [x]  No     Date Range for reporting period:  Beginning:  3/24/2021  Ending:      Progress report due (10 Rx/or 30 days whichever is less): 3/14/6328     Recertification due (POC duration/ or 90 days whichever is less): 2021     Visit # Insurance Allowable Auth Needed   17  []Yes    []No     Pain level:  0/10     SUBJECTIVE:  Pt states he is still feeling good. No new complaints. Pt has his appointment with the surgeon next week. Pt plans to have one more visit in [facility] before that. OBJECTIVE: passive shoulder flexion a little tight at end-range. No pain associated.      Observation:   Test measurements:      2021  ROM Left Right   Shoulder Flex WFL Slightly limited   Shoulder Abd WFL Tight at end range   Shoulder ER [facility]   Shoulder IR [facility]   Elbow flex    Elbow ext [facility] 1       2021  ROM Left Right   Shoulder Flex [facility] WFL; tight   Shoulder Abd [facility] WFL; tight    Shoulder ER [facility]   Shoulder IR [facility] WFL   Elbow flex [facility] WFL   Elbow ext [facility] [place]   Strength  Left Right   Shoulder Flex 35.1 39.1   Shoulder Scap 25.5 38.5   Shoulder ER 28.1 34.1   Shoulder IR 27.1 29.2   Elbow flex 28.0 30.6   Elbow ext 31.9 32.6    with elbow bent 116 105     Quick DASH: 0%      RESTRICTIONS/PRECAUTIONS: follow [facility] protocol for [place]    Exercises/Interventions:   Therapeutic Ex (75427)  Min: 40 Sets/sec Reps CUES/Notes   UBE 4           lawnmowers 1 15 5lb, elbow at side, progress range NV   TB tricep ext green; 2 planes   T- band D2 flexion to 90/90 2 10 Green tube, standing   T- band lower pinch  TB elbow ext  TB shoulder ext   Green  black   T- band ER activation  IR 1  1 20  20 Double Green  Green   Serratus punch on BOSU 5sec 12 Red kb, shoulders on BOSU, LE's in bridge   SL ER/SL punch 3 10 6# ER   (3 sets), 5# punch (2 sets)   Prone Rows/ext on TB bilat 1 20 7#   Prone HAB/scaption bilat 1 20 6# HAB, 5# Y   Waterfall flexion/scaption 5 5 3lb, kneeling on BOSU   TRX rows 2 10 Easy amount of incline   No Money + movement 1 12 Orange band, kneeling on BOSU with toes up, maintaining scap retraction as Pt goes from neutral to 90 deg flexion   Scap Wall Lat touches/wall walks Orange; bosu   Standing D2 flex w/ ball hold Red TB   Half kneel ER 90/90  4#   rebounder chest passes 2 20 4lb mb, easy intensity   Pron/sup water stick w/ shoulder flex 2 10 Pronation to neutral, supination to neutral.         3 way bicep 2 10 8#               Manual Intervention  (29149)  Min:       Shld /GH Mobs      Post Cap mobs      Thoracic/Rib manipualtion      CT MT/Mobs      PROM MT/wrist PROM  3  Shoulder flexion   Elbow mobs 3  End range ext/flex   IASTM 6  R distal bicep and tricep, subscap   NMR re-education (71510)  Min: 14      T-spine Ext      OH carry earthquake bar   Red KB   Murphy carry 2 laps  Orange w/ yellow KB   Body blade 10-15 4 Regular BB at 45 deg, lunge onto BOSU   Wall ball roll 1 20 Blue, cw/ccw   Wall Ball bounce 2 20 Red; flex to abd, ER   Ball drops in squatted position leaning fwd 2 20 T/Y; 1 set short range, 1 set increased range- 21oz   Box walk overs 2 5 4'' box   Eccentric control 2 10 Kneeling on bosu- multidirectional         Therapeutic Activity (32982)  Min:      UE throwing porgression      Dynamic UE stability [facility]      Bodyblade                Therapeutic Exercise and NMR EXR  [x] (04383) Provided verbal/tactile cueing for activities related to strengthening, flexibility, endurance, ROM  for improvements in scapular, scapulothoracic and UE control with self care, reaching, carrying, lifting, house/yardwork, driving/computer work. [x] (38755) Provided verbal/tactile cueing for activities related to improving balance, coordination, kinesthetic sense, posture, motor skill, proprioception  to assist with  scapular, scapulothoracic and UE control with self care, reaching, carrying, lifting, house/yardwork, driving/computer work. Therapeutic Activities:    [] (21044 or 18864) Provided verbal/tactile cueing for activities related to improving balance, coordination, kinesthetic sense, posture, motor skill, proprioception and motor activation to allow for proper function of scapular, scapulothoracic and UE control with self care, carrying, lifting, driving/computer work.      Home Exercise Program:    [x] (70298) Reviewed/Progressed HEP activities related to strengthening, flexibility, endurance, ROM of scapular, scapulothoracic and UE control with self care, reaching, carrying, lifting, house/yardwork, driving/computer work  [] (41444) Reviewed/Progressed HEP activities related to improving balance, coordination, kinesthetic sense, posture, motor skill, proprioception of scapular, scapulothoracic and UE control with self care, reaching, carrying, lifting, house/yardwork, driving/computer work      Manual Treatments:  PROM / STM / Oscillations-Mobs:  G-I, II, III, IV (PA's, Inf., Post.)  [x] (57618) Provided manual therapy to mobilize soft tissue/joints of cervical/CT, scapular GHJ and UE for the purpose of modulating pain, promoting relaxation,  increasing ROM, reducing/eliminating soft tissue swelling/inflammation/restriction, improving soft tissue extensibility and allowing for proper ROM for normal function with self care, reaching, carrying, lifting, house/yardwork, driving/computer work    Modalities:  none    Charges:  Timed Code Treatment M inutes: 54   Total Treatment Minutes: 54       [] EVAL (LOW) 03999 (typically 20 minutes face-to-face)  [] EVAL (MOD) 44151 (typically 30 minutes face-to-face)  [] EVAL (HIGH) 54484 (typically 45 minutes face-to-face)  [] RE-EVAL     [x] DG(94904) x  3   [] DRY NEEDLE 1 OR 2 MUSCLES  [x] NMR (54472) x 1    [] DRY NEEDLE 3+ MUSCLES  [] Manual (71602) x      [] TA (04508) x     [] Mech Traction (76798)  [] ES(attended) (81464)     [] ES (un) (94913):   [] VASO (96551)  [] Other:      GOALS:  Patient stated goal: return to pain free throwing program  [] Progressing: [] Met: [] Not Met: [] Adjusted    Therapist goals for Patient:   Short Term Goals: To be achieved in: 2 weeks  1. Independent in HEP and progression per patient tolerance, in order to prevent re-injury. [] Progressing: [] Met: [] Not Met: [] Adjusted  2. Patient will have a decrease in pain to facilitate improvement in movement, function, and ADLs as indicated by Functional Deficits. [] Progressing: [] Met: [] Not Met: [] Adjusted    Long Term Goals: To be achieved in: 4-6 weeks  1. Disability index score of 5% or less for the Quick DASH to assist with reaching prior level of function. [] Progressing: [] Met: [] Not Met: [] Adjusted  2. Patient will demonstrate increased AROM to [facility] to allow for proper joint functioning as indicated by Functional Deficits. [] Progressing: [] Met: [] Not Met: [] Adjusted  3. Patient will demonstrate an increase in NM recruitment/activation and overall GH and scapular strength to within n5lbs HHD or WNL for proper functional mobility as indicated by patients Functional Deficits. [] Progressing: [] Met: [] Not Met: [] Adjusted  4. Patient will return to throwing, weight training activities without increased symptoms or restriction.    [] Progressing: [] Met: [] Not Met: []

## 2021-06-08 ENCOUNTER — HOSPITAL ENCOUNTER (OUTPATIENT)
Dept: PHYSICAL THERAPY | Age: 26
Setting detail: THERAPIES SERIES
Discharge: HOME | End: 2021-06-08
Payer: COMMERCIAL

## 2021-06-08 PROCEDURE — 97110 THERAPEUTIC EXERCISES: CPT

## 2021-06-08 PROCEDURE — 97112 NEUROMUSCULAR REEDUCATION: CPT

## 2021-06-08 NOTE — FLOWSHEET NOTE
[facility] [address] [address] [place]  Phone: [phone] Fax: [phone]      Physical Therapy Treatment Note/ Progress Report:     Date:  2021    Patient Name:  Chayo Sue    :  1995  MRN: [id number]  Restrictions/Precautions:    Medical/Treatment Diagnosis Information:  · Diagnosis: s/p right Gabino Payor  · Treatment Diagnosis: X28.230  Insurance/Certification information:  PT Insurance Information: [place]/BER[place]  Physician Information:  Referring Practitioner: Lucia Moore MD  Plan of care signed (Y/N):     Date of Patient follow up with Physician:      Progress Report: []  Yes  [x]  No     Date Range for reporting period:  Beginning:  3/24/2021  Ending:      Progress report due (10 Rx/or 30 days whichever is less):      Recertification due (POC duration/ or 90 days whichever is less): 2021     Visit # Insurance Allowable Auth Needed   18  []Yes    []No     Pain level:  0/10     SUBJECTIVE:  Pt states he is still feeling good. No new complaints. Pt has his appointment with the surgeon next week. Planned for today to be his last day in [place] and transfer to [facility] later this week. OBJECTIVE: passive shoulder flexion a little tight at end-range. No pain associated.      Observation:   Test measurements:      2021  ROM Left Right   Shoulder Flex WFL Slightly limited   Shoulder Abd WFL Tight at end range   Shoulder ER [facility]   Shoulder IR [facility]   Elbow flex    Elbow ext [facility] 1       2021  ROM Left Right   Shoulder Flex [facility] WFL; tight   Shoulder Abd [facility] WFL; tight    Shoulder ER [facility]   Shoulder IR [facility] WFL   Elbow flex [facility] WFL   [facility]   Strength  Left Right   Shoulder Flex 35.1 39.1   Shoulder Scap 25.5 38.5   Shoulder ER 28.1 34.1   Shoulder IR 27.1 29.2   Elbow flex 28.0 30.6   Elbow ext 31.9 32.6    with elbow bent 116 105     Quick DASH: 0%      RESTRICTIONS/PRECAUTIONS: follow [place] protocol for [employer]    Exercises/Interventions:   Therapeutic Ex (08871)  Min: 40 Sets/sec Reps CUES/Notes   UBE 4           lawnmowers 1 15 5lb, elbow at side, progress range NV   TB tricep ext green; 2 planes   T- band D2 flexion to 90/90 2 10 Green tube, standing   T- band lower pinch  TB elbow ext  TB shoulder ext   Green  black   T- band ER activation  IR 1  1 20  20 Double Green  Green   Serratus punch on BOSU 5sec 12 Red kb, shoulders on BOSU, LE's in bridge   SL ER/SL punch 3 10 6# ER   (3 sets), 5# punch (2 sets)   Prone Rows/ext on TB bilat 1 20 7#   Prone HAB/scaption bilat 1 20 6# HAB, 5# Y   Waterfall flexion/scaption 5 5 3lb, kneeling on BOSU   TRX rows 2 10 Easy amount of incline   No Money + movement 1 12 Orange band, kneeling on BOSU with toes up, maintaining scap retraction as Pt goes from neutral to 90 deg flexion   Scap Wall Lat touches/wall walks Orange; bosu   Standing D2 flex w/ ball hold Red TB   Half kneel ER 90/90  4#   rebounder chest passes 2 20 4lb mb, easy intensity   Pron/sup water stick w/ shoulder flex 2 10 Pronation to neutral, supination to neutral.         3 way bicep 2 10 8#               Manual Intervention  (85996)  Min:       Shld /GH Mobs      Post Cap mobs      Thoracic/Rib manipualtion      CT MT/Mobs      PROM MT/wrist PROM  3  Shoulder flexion   Elbow mobs 3  End range ext/flex   IASTM 6  R distal bicep and tricep, subscap   NMR re-education (06802)  Min: 14      T-spine Ext      OH carry earthquake bar   Red KB   Murphy carry 2 laps  Orange w/ yellow KB   Body blade 10-15 4 Regular BB at 45 deg, lunge onto BOSU   Wall ball roll 1 20 Blue, cw/ccw   Wall Ball bounce 2 20 Red; flex to abd, ER   Ball drops in squatted position leaning fwd 2 20 T/Y; 1 set short range, 1 set increased range- 21oz   Box walk overs 2 5 4'' box   Eccentric control 2 10 Kneeling on bosu- multidirectional         Therapeutic Activity (76660)  Min:      UE throwing porgression      Dynamic UE stability      Earthquake Bar      Bodyblade                Therapeutic Exercise and NMR EXR  [x] (97974) Provided verbal/tactile cueing for activities related to strengthening, flexibility, endurance, ROM  for improvements in scapular, scapulothoracic and UE control with self care, reaching, carrying, lifting, house/yardwork, driving/computer work. [x] (06058) Provided verbal/tactile cueing for activities related to improving balance, coordination, kinesthetic sense, posture, motor skill, proprioception  to assist with  scapular, scapulothoracic and UE control with self care, reaching, carrying, lifting, house/yardwork, driving/computer work. Therapeutic Activities:    [] (20491 or 75941) Provided verbal/tactile cueing for activities related to improving balance, coordination, kinesthetic sense, posture, motor skill, proprioception and motor activation to allow for proper function of scapular, scapulothoracic and UE control with self care, carrying, lifting, driving/computer work.      Home Exercise Program:    [x] (03858) Reviewed/Progressed HEP activities related to strengthening, flexibility, endurance, ROM of scapular, scapulothoracic and UE control with self care, reaching, carrying, lifting, house/yardwork, driving/computer work  [] (76404) Reviewed/Progressed HEP activities related to improving balance, coordination, kinesthetic sense, posture, motor skill, proprioception of scapular, scapulothoracic and UE control with self care, reaching, carrying, lifting, house/yardwork, driving/computer work      Manual Treatments:  PROM / STM / Oscillations-Mobs:  G-I, II, III, IV (PA's, Inf., Post.)  [x] (84961) Provided manual therapy to mobilize soft tissue/joints of cervical/CT, scapular GHJ and UE for the purpose of modulating pain, promoting relaxation,  increasing ROM, reducing/eliminating soft tissue swelling/inflammation/restriction, improving soft tissue extensibility and allowing for proper ROM for normal function with self care, reaching, carrying, lifting, house/yardwork, driving/computer work    Modalities:  none    Charges:  Timed Code Treatment M inutes: 54   Total Treatment Minutes: 54       [] EVAL (LOW) 35763 (typically 20 minutes face-to-face)  [] EVAL (MOD) 63211 (typically 30 minutes face-to-face)  [] EVAL (HIGH) 75526 (typically 45 minutes face-to-face)  [] RE-EVAL     [x] GH(18256) x  3   [] DRY NEEDLE 1 OR 2 MUSCLES  [x] NMR (95973) x 1    [] DRY NEEDLE 3+ MUSCLES  [] Manual (89760) x      [] TA (62498) x     [] Mech Traction (91309)  [] ES(attended) (57807)     [] ES (un) (98836):   [] VASO (21477)  [] Other:      GOALS:  Patient stated goal: return to pain free throwing program  [] Progressing: [] Met: [] Not Met: [] Adjusted    Therapist goals for Patient:   Short Term Goals: To be achieved in: 2 weeks  1. Independent in HEP and progression per patient tolerance, in order to prevent re-injury. [] Progressing: [] Met: [] Not Met: [] Adjusted  2. Patient will have a decrease in pain to facilitate improvement in movement, function, and ADLs as indicated by Functional Deficits. [] Progressing: [] Met: [] Not Met: [] Adjusted    Long Term Goals: To be achieved in: 4-6 weeks  1. Disability index score of 5% or less for the Quick DASH to assist with reaching prior level of function. [] Progressing: [] Met: [] Not Met: [] Adjusted  2. Patient will demonstrate increased AROM to [facility] to allow for proper joint functioning as indicated by Functional Deficits. [] Progressing: [] Met: [] Not Met: [] Adjusted  3. Patient will demonstrate an increase in NM recruitment/activation and overall GH and scapular strength to within n5lbs HHD or WNL for proper functional mobility as indicated by patients Functional Deficits. [] Progressing: [] Met: [] Not Met: [] Adjusted  4.  Patient will return to throwing, weight training activities without increased symptoms or restriction. [] Progressing: [] Met: [] Not Met: [] Adjusted      ASSESSMENT:  Good tolerance to progression of program today. No issues with addition of lawnmowers, TRX row, rebounder chest pass or progressing band work into 45 and 90 degree positions. Cues needed for scap position w/ RTC strength. No complaints of soreness at conclusion. Does have some tightness at end range elbow extension, however has firm end feel and improved tightness w/ ulnar distraction, added self distraction to HEP. Return to Play: (if applicable)   [x]  Stage 1: Intro to Strength   []  Stage 2: Dynamic Strength and Intro to Plyometrics   []  Stage 3: Advanced Plyometrics and Intro to Throwing   []  Stage 4: Sport specific Training/Return to Sport     []  Ready to Return to Play, [employer]   []  Not Ready for Return to Sports   Comments:      Treatment/Activity Tolerance:  [x] Patient tolerated treatment well [] Patient limited by fatique  [] Patient limited by pain  [] Patient limited by other medical complications  [] Other:     Overall Progression Towards Functional goals/ Treatment Progress Update:  [] Patient is progressing as expected towards functional goals listed. [] Progression is slowed due to complexities/Impairments listed. [] Progression has been slowed due to co-morbidities.   [x] Plan just implemented, too soon to assess goals progression <30days   [] Goals require adjustment due to lack of progress  [] Patient is not progressing as expected and requires additional follow up with physician  [] Other    Prognosis for POC: [x] Good [] Fair  [] Poor    Patient requires continued skilled intervention: [x] Yes  [] No      PLAN: See eval   [x] Continue per plan of care [] Alter current plan (see comments)  [] Plan of care initiated [] Hold pending MD visit [] Discharge    Electronically signed by: Joseph Banuelos, PT     Note: If patient does not return for scheduled/recommended follow up visits, this note will serve as a discharge from care along with the most recent update on progress.